# Patient Record
Sex: MALE | Race: WHITE | NOT HISPANIC OR LATINO | Employment: OTHER | URBAN - NONMETROPOLITAN AREA
[De-identification: names, ages, dates, MRNs, and addresses within clinical notes are randomized per-mention and may not be internally consistent; named-entity substitution may affect disease eponyms.]

---

## 2023-11-25 DIAGNOSIS — I42.8 NON-ISCHEMIC CARDIOMYOPATHY (MULTI): ICD-10-CM

## 2023-11-25 DIAGNOSIS — I10 HYPERTENSION, UNSPECIFIED TYPE: ICD-10-CM

## 2023-11-27 PROBLEM — I49.9 IRREGULAR HEART RATE: Status: ACTIVE | Noted: 2023-11-27

## 2023-11-27 PROBLEM — R91.1 PULMONARY NODULE: Status: ACTIVE | Noted: 2023-11-27

## 2023-11-27 PROBLEM — F14.91 HISTORY OF COCAINE USE: Status: ACTIVE | Noted: 2023-11-27

## 2023-11-27 PROBLEM — I50.9: Status: ACTIVE | Noted: 2023-11-27

## 2023-11-27 PROBLEM — E11.9 DIABETES MELLITUS (MULTI): Status: ACTIVE | Noted: 2023-11-27

## 2023-11-27 PROBLEM — I21.4 NON-ST ELEVATION MYOCARDIAL INFARCTION (NSTEMI) (MULTI): Status: ACTIVE | Noted: 2023-11-27

## 2023-11-27 PROBLEM — I25.119 CORONARY ARTERY DISEASE INVOLVING NATIVE CORONARY ARTERY OF NATIVE HEART WITH ANGINA PECTORIS (CMS-HCC): Status: ACTIVE | Noted: 2023-11-27

## 2023-11-27 PROBLEM — E78.5 HLD (HYPERLIPIDEMIA): Status: ACTIVE | Noted: 2023-11-27

## 2023-11-27 PROBLEM — I10 HYPERTENSION: Status: ACTIVE | Noted: 2023-11-27

## 2023-11-27 PROBLEM — R79.89 ELEVATED TROPONIN: Status: ACTIVE | Noted: 2023-11-27

## 2023-11-27 PROBLEM — I42.8 NON-ISCHEMIC CARDIOMYOPATHY (MULTI): Status: ACTIVE | Noted: 2023-11-27

## 2023-11-27 RX ORDER — ATORVASTATIN CALCIUM 40 MG/1
40 TABLET, FILM COATED ORAL DAILY
COMMUNITY

## 2023-11-27 RX ORDER — OMEPRAZOLE 40 MG/1
40 CAPSULE, DELAYED RELEASE ORAL DAILY
COMMUNITY

## 2023-11-27 RX ORDER — MAGNESIUM 250 MG
1 TABLET ORAL DAILY
COMMUNITY

## 2023-11-27 RX ORDER — GINKGO BILOBA LEAF EXTRACT 40 MG
1 CAPSULE ORAL DAILY
COMMUNITY

## 2023-11-27 RX ORDER — ASPIRIN 81 MG/1
1 TABLET ORAL DAILY
COMMUNITY

## 2023-11-27 RX ORDER — METFORMIN HYDROCHLORIDE 500 MG/1
500 TABLET, EXTENDED RELEASE ORAL
COMMUNITY

## 2023-11-27 RX ORDER — LOSARTAN POTASSIUM 25 MG/1
25 TABLET ORAL DAILY
COMMUNITY

## 2023-11-30 RX ORDER — CARVEDILOL 6.25 MG/1
6.25 TABLET ORAL 2 TIMES DAILY
Qty: 180 TABLET | Refills: 0 | Status: SHIPPED | OUTPATIENT
Start: 2023-11-30 | End: 2024-02-29

## 2023-12-12 ENCOUNTER — OFFICE VISIT (OUTPATIENT)
Dept: CARDIOLOGY | Facility: CLINIC | Age: 73
End: 2023-12-12
Payer: MEDICARE

## 2023-12-12 VITALS
HEART RATE: 68 BPM | BODY MASS INDEX: 28.17 KG/M2 | WEIGHT: 208 LBS | SYSTOLIC BLOOD PRESSURE: 112 MMHG | DIASTOLIC BLOOD PRESSURE: 70 MMHG | HEIGHT: 72 IN

## 2023-12-12 DIAGNOSIS — E66.3 OVERWEIGHT WITH BODY MASS INDEX (BMI) OF 28 TO 28.9 IN ADULT: ICD-10-CM

## 2023-12-12 DIAGNOSIS — I42.8 NON-ISCHEMIC CARDIOMYOPATHY (MULTI): ICD-10-CM

## 2023-12-12 DIAGNOSIS — I50.22 CHRONIC SYSTOLIC CONGESTIVE HEART FAILURE, NYHA CLASS 2 (MULTI): ICD-10-CM

## 2023-12-12 DIAGNOSIS — E11.9 TYPE 2 DIABETES MELLITUS WITHOUT COMPLICATION, WITHOUT LONG-TERM CURRENT USE OF INSULIN (MULTI): ICD-10-CM

## 2023-12-12 DIAGNOSIS — E78.2 MIXED HYPERLIPIDEMIA: ICD-10-CM

## 2023-12-12 DIAGNOSIS — I10 PRIMARY HYPERTENSION: ICD-10-CM

## 2023-12-12 DIAGNOSIS — I25.119 CORONARY ARTERY DISEASE INVOLVING NATIVE CORONARY ARTERY OF NATIVE HEART WITH ANGINA PECTORIS (CMS-HCC): ICD-10-CM

## 2023-12-12 PROCEDURE — 1036F TOBACCO NON-USER: CPT | Performed by: INTERNAL MEDICINE

## 2023-12-12 PROCEDURE — 99213 OFFICE O/P EST LOW 20 MIN: CPT | Performed by: INTERNAL MEDICINE

## 2023-12-12 PROCEDURE — 3074F SYST BP LT 130 MM HG: CPT | Performed by: INTERNAL MEDICINE

## 2023-12-12 PROCEDURE — 1160F RVW MEDS BY RX/DR IN RCRD: CPT | Performed by: INTERNAL MEDICINE

## 2023-12-12 PROCEDURE — 4010F ACE/ARB THERAPY RXD/TAKEN: CPT | Performed by: INTERNAL MEDICINE

## 2023-12-12 PROCEDURE — 3078F DIAST BP <80 MM HG: CPT | Performed by: INTERNAL MEDICINE

## 2023-12-12 PROCEDURE — 1159F MED LIST DOCD IN RCRD: CPT | Performed by: INTERNAL MEDICINE

## 2023-12-12 ASSESSMENT — ENCOUNTER SYMPTOMS: DIZZINESS: 1

## 2023-12-12 NOTE — PATIENT INSTRUCTIONS
Please bring all medicines, vitamins, and herbal supplements with you when you come to the office.    Prescriptions will not be filled unless you are compliant with your follow up appointments or have a follow up appointment scheduled as per instruction of your physician. Refills should be requested at the time of your visit.      Drink plenty of fluids/water

## 2023-12-12 NOTE — PROGRESS NOTES
Subjective   Alfie Slaughter is a 73 y.o. male       Chief Complaint    Follow-up          73-year-old gentleman returns for annual follow-up and is doing well with complaints of dizziness.  He denies any shortness of breath, hospitalizations or angina, he denies any syncope.  He has a history of non-STEMI associated with cocaine use several years ago, cardiac catheterization revealed moderate two-vessel disease that underwent physiologic/FFR assessment and was found to be negative.  LV function is mildly impaired with ejection fraction last measured at 41%.  He remains on appropriate GDMT as reviewed and tolerating these medications well again with no repeat hospitalizations    He does smoke marijuana but no longer imbibes in cocaine.    He does have diabetes.  His last laboratory assessment revealed LDL level of 54 normal liver function tests    Recommendations: Continue current therapies, follow-up again in 1 year         Review of Systems   Neurological:  Positive for dizziness.   All other systems reviewed and are negative.         Visit Vitals  /70 (BP Location: Left arm, Patient Position: Sitting)   Pulse 68   Ht 1.829 m (6')   Wt 94.3 kg (208 lb)   BMI 28.21 kg/m²   Smoking Status Former   BSA 2.19 m²        Objective   Physical Exam  Constitutional:       Appearance: Normal appearance. He is normal weight.   HENT:      Nose: Nose normal.   Neck:      Vascular: No carotid bruit.   Cardiovascular:      Rate and Rhythm: Normal rate.      Pulses: Normal pulses.      Heart sounds: Normal heart sounds.   Pulmonary:      Effort: Pulmonary effort is normal.   Abdominal:      General: Bowel sounds are normal.      Palpations: Abdomen is soft.   Genitourinary:     Rectum: Normal.   Musculoskeletal:         General: Normal range of motion.      Cervical back: Normal range of motion.      Right lower leg: No edema.      Left lower leg: No edema.   Skin:     General: Skin is warm and dry.   Neurological:       General: No focal deficit present.      Mental Status: He is alert.   Psychiatric:         Mood and Affect: Mood normal.         Behavior: Behavior normal.         Thought Content: Thought content normal.         Judgment: Judgment normal.         Current Medications    Current Outpatient Medications:     aspirin 81 mg EC tablet, Take 1 tablet (81 mg) by mouth once daily., Disp: , Rfl:     atorvastatin (Lipitor) 40 mg tablet, Take 1 tablet (40 mg) by mouth once daily., Disp: , Rfl:     carvedilol (Coreg) 6.25 mg tablet, Take 1 tablet by mouth twice daily, Disp: 180 tablet, Rfl: 0    ginkgo biloba leaf extract 40 mg capsule, Take 1 capsule by mouth once daily., Disp: , Rfl:     losartan (Cozaar) 25 mg tablet, Take 1 tablet (25 mg) by mouth once daily., Disp: , Rfl:     magnesium 250 mg tablet, Take 1 tablet (250 mg) by mouth once daily., Disp: , Rfl:     metFORMIN  mg 24 hr tablet, Take 1 tablet (500 mg) by mouth 2 times a day with meals., Disp: , Rfl:     multivit-min/ferrous fumarate (MULTI VITAMIN ORAL), Take 1 tablet by mouth once daily., Disp: , Rfl:     omeprazole (PriLOSEC) 40 mg DR capsule, Take 1 capsule (40 mg) by mouth once daily., Disp: , Rfl:                      Assessment/Plan   1. Coronary artery disease involving native coronary artery of native heart with angina pectoris (CMS/HCC)  Follow Up In Cardiology      2. Non-ischemic cardiomyopathy (CMS/HCC)  Follow Up In Cardiology      3. Chronic systolic congestive heart failure, NYHA class 2 (CMS/HCC)        4. Mixed hyperlipidemia        5. Primary hypertension        6. Type 2 diabetes mellitus without complication, without long-term current use of insulin (CMS/HCC)        7. Overweight with body mass index (BMI) of 28 to 28.9 in adult             Scribe Attestation  By signing my name below, Conchita MERAZ LPN  , Scribe   attest that this documentation has been prepared under the direction and in the presence of Erik Thomas DO.

## 2024-02-29 DIAGNOSIS — I10 HYPERTENSION, UNSPECIFIED TYPE: ICD-10-CM

## 2024-02-29 DIAGNOSIS — I42.8 NON-ISCHEMIC CARDIOMYOPATHY (MULTI): ICD-10-CM

## 2024-02-29 RX ORDER — CARVEDILOL 6.25 MG/1
6.25 TABLET ORAL 2 TIMES DAILY
Qty: 180 TABLET | Refills: 3 | Status: SHIPPED | OUTPATIENT
Start: 2024-02-29

## 2024-06-25 ENCOUNTER — APPOINTMENT (OUTPATIENT)
Dept: CARDIOLOGY | Facility: CLINIC | Age: 74
End: 2024-06-25
Payer: MEDICARE

## 2024-06-25 VITALS
HEART RATE: 79 BPM | HEIGHT: 71 IN | SYSTOLIC BLOOD PRESSURE: 108 MMHG | DIASTOLIC BLOOD PRESSURE: 60 MMHG | BODY MASS INDEX: 28.84 KG/M2 | WEIGHT: 206 LBS

## 2024-06-25 DIAGNOSIS — I10 PRIMARY HYPERTENSION: ICD-10-CM

## 2024-06-25 DIAGNOSIS — Z87.891 FORMER SMOKER: ICD-10-CM

## 2024-06-25 DIAGNOSIS — I47.10 PAROXYSMAL SUPRAVENTRICULAR TACHYCARDIA (CMS-HCC): ICD-10-CM

## 2024-06-25 DIAGNOSIS — E11.9 TYPE 2 DIABETES MELLITUS WITHOUT COMPLICATION, WITHOUT LONG-TERM CURRENT USE OF INSULIN (MULTI): ICD-10-CM

## 2024-06-25 DIAGNOSIS — I25.10 ASHD (ARTERIOSCLEROTIC HEART DISEASE): ICD-10-CM

## 2024-06-25 DIAGNOSIS — I42.8 NON-ISCHEMIC CARDIOMYOPATHY (MULTI): ICD-10-CM

## 2024-06-25 PROBLEM — R42 DIZZINESS: Status: ACTIVE | Noted: 2024-06-25

## 2024-06-25 PROCEDURE — 1036F TOBACCO NON-USER: CPT | Performed by: INTERNAL MEDICINE

## 2024-06-25 PROCEDURE — 3078F DIAST BP <80 MM HG: CPT | Performed by: INTERNAL MEDICINE

## 2024-06-25 PROCEDURE — 93000 ELECTROCARDIOGRAM COMPLETE: CPT | Performed by: INTERNAL MEDICINE

## 2024-06-25 PROCEDURE — 99214 OFFICE O/P EST MOD 30 MIN: CPT | Performed by: INTERNAL MEDICINE

## 2024-06-25 PROCEDURE — 1160F RVW MEDS BY RX/DR IN RCRD: CPT | Performed by: INTERNAL MEDICINE

## 2024-06-25 PROCEDURE — 1159F MED LIST DOCD IN RCRD: CPT | Performed by: INTERNAL MEDICINE

## 2024-06-25 PROCEDURE — 3074F SYST BP LT 130 MM HG: CPT | Performed by: INTERNAL MEDICINE

## 2024-06-25 RX ORDER — LOSARTAN POTASSIUM 25 MG/1
25 TABLET ORAL DAILY
Qty: 90 TABLET | Refills: 3 | Status: SHIPPED | OUTPATIENT
Start: 2024-06-25 | End: 2025-06-25

## 2024-06-25 RX ORDER — DIGOXIN 125 MCG
2 TABLET ORAL DAILY
COMMUNITY
End: 2024-06-25 | Stop reason: ALTCHOICE

## 2024-06-25 RX ORDER — CARVEDILOL 6.25 MG/1
6.25 TABLET ORAL 2 TIMES DAILY
Qty: 180 TABLET | Refills: 3 | Status: SHIPPED | OUTPATIENT
Start: 2024-06-25 | End: 2025-06-25

## 2024-06-25 RX ORDER — DILTIAZEM HYDROCHLORIDE 30 MG/1
30 TABLET, FILM COATED ORAL 3 TIMES DAILY
COMMUNITY
End: 2024-06-25 | Stop reason: ALTCHOICE

## 2024-06-25 RX ORDER — DILTIAZEM HYDROCHLORIDE 30 MG/1
30 TABLET, FILM COATED ORAL AS NEEDED
Qty: 120 TABLET | Refills: 11 | Status: SHIPPED | OUTPATIENT
Start: 2024-06-25 | End: 2025-06-25

## 2024-06-25 NOTE — LETTER
June 25, 2024     RHODA Orellana-CNP  [address] [facility] [id number]    Patient: Alfie Slaughter   YOB: 1950   Date of Visit: 6/25/2024       Dear Dr. La Waggoner, RHODA-CNP:    Thank you for referring Alfie Slaughter to me for evaluation. Below are my notes for this consultation.  If you have questions, please do not hesitate to call me. I look forward to following your patient along with you.       Sincerely,     Erik Thomas, DO      CC: No Recipients  ______________________________________________________________________________________    Subjective   Alfie Slaughter is a 74 y.o. male       Chief Complaint    Hospital Follow-up          74-year-old gentleman returns to the office with interim events, with episodes of PSVT, hospitalized at [facility], initiated on diltiazem and digoxin, seen by Dr. Smith, his consult note is reviewed.  Also seen at [facility] emergency room for other non-arrhythmic complaints but discharged after receiving fluids, hydration, magnesium and Rod telemetry patch which he just mailed in last Monday.  We are not in receipt of his telemetry monitor as of yet.  Finally and Janae 3 he was in [facility] ER for tachyarrhythmia as well, it appears they gave him either diltiazem or Adenocard that slowed him down significantly and he was discharged to follow-up with myself.  Details of all these ER visits and consult notes are reviewed    He is currently stable, sinus rhythm today at a rate of 79 a QT corrected interval 474 ms with leftward axis and cannot rule out anterolateral MI by voltage criteria.    He has had previous non-ST elevation MI with negative FFR assessment of both LAD and circumflex     Patient is otherwise doing well currently, ECG as described above, we did discuss potential for EP referral for any recurrence of PSVT.  Will continue to recommend as needed diltiazem 30 mg as needed for recurrence and if multiple recurrences will  "refer to EP in the future      Review of Systems   All other systems reviewed and are negative.           Vitals:    06/25/24 1616   BP: 108/60   BP Location: Right arm   Patient Position: Sitting   Pulse: 79   Weight: 93.4 kg (206 lb)   Height: 1.803 m (5' 11\")        EKG done in office today     Objective   Physical Exam  Constitutional:       Appearance: Normal appearance.   HENT:      Nose: Nose normal.   Neck:      Vascular: No carotid bruit.   Cardiovascular:      Rate and Rhythm: Normal rate.      Pulses: Normal pulses.      Heart sounds: Normal heart sounds.   Pulmonary:      Effort: Pulmonary effort is normal.   Abdominal:      General: Bowel sounds are normal.      Palpations: Abdomen is soft.   Musculoskeletal:         General: Normal range of motion.      Cervical back: Normal range of motion.      Right lower leg: No edema.      Left lower leg: No edema.   Skin:     General: Skin is warm and dry.   Neurological:      General: No focal deficit present.      Mental Status: He is alert.   Psychiatric:         Mood and Affect: Mood normal.         Behavior: Behavior normal.         Thought Content: Thought content normal.         Judgment: Judgment normal.       Allergies  Amoxicillin, Ampicillin, and Penicillins     Current Medications    Current Outpatient Medications:   •  aspirin 81 mg EC tablet, Take 1 tablet (81 mg) by mouth once daily., Disp: , Rfl:   •  atorvastatin (Lipitor) 40 mg tablet, Take 1 tablet (40 mg) by mouth once daily., Disp: , Rfl:   •  carvedilol (Coreg) 6.25 mg tablet, Take 1 tablet by mouth twice daily, Disp: 180 tablet, Rfl: 3  •  ginkgo biloba leaf extract 40 mg capsule, Take 1 capsule by mouth once daily., Disp: , Rfl:   •  losartan (Cozaar) 25 mg tablet, Take 1 tablet (25 mg) by mouth once daily., Disp: , Rfl:   •  magnesium 250 mg tablet, Take 1 tablet (250 mg) by mouth once daily., Disp: , Rfl:   •  metFORMIN  mg 24 hr tablet, Take 1 tablet (500 mg) by mouth 2 times " daily (morning and late afternoon)., Disp: , Rfl:   •  multivit-min/ferrous fumarate (MULTI VITAMIN ORAL), Take 1 tablet by mouth once daily., Disp: , Rfl:   •  omeprazole (PriLOSEC) 40 mg DR capsule, Take 1 capsule (40 mg) by mouth once daily., Disp: , Rfl:                      Assessment/Plan   1. ASHD (arteriosclerotic heart disease)        2. Paroxysmal supraventricular tachycardia (CMS-HCC)        3. Non-ischemic cardiomyopathy (Multi)        4. Type 2 diabetes mellitus without complication, without long-term current use of insulin (Multi)        5. Primary hypertension        6. BMI 28.0-28.9,adult        7. Former smoker                 Scribe Attestation  By signing my name below, I, Theresa DUNLAP LPN  , Scribe   attest that this documentation has been prepared under the direction and in the presence of Erik Thomas DO.     Provider Attestation - Scribe documentation    All medical record entries made by the Scribe were at my direction and personally dictated by me. I have reviewed the chart and agree that the record accurately reflects my personal performance of the history, physical exam, discussion and plan.

## 2024-06-25 NOTE — PATIENT INSTRUCTIONS
BMI was above normal measurement. Current weight: 93.4 kg (206 lb)  Weight change since last visit (-) denotes wt loss -2 lbs   Weight loss needed to achieve BMI 25: 27.1 Lbs  Weight loss needed to achieve BMI 30: -8.6 Lbs  Please bring all medicines, vitamins, and herbal supplements with you when you come to the office.    Prescriptions will not be filled unless you are compliant with your follow up appointments or have a follow up appointment scheduled as per instruction of your physician. Refills should be requested at the time of your visit.

## 2024-06-25 NOTE — PROGRESS NOTES
"Subjective   Alfie Slaughter is a 74 y.o. male       Chief Complaint    Hospital Follow-up          74-year-old gentleman returns to the office with interim events, with episodes of PSVT, hospitalized at [facility], initiated on diltiazem and digoxin, seen by Dr. Smith, his consult note is reviewed.  Also seen at [facility] emergency room for other non-arrhythmic complaints but discharged after receiving fluids, hydration, magnesium and Rod telemetry patch which he just mailed in last Monday.  We are not in receipt of his telemetry monitor as of yet.  Finally and Janae 3 he was in [facility] ER for tachyarrhythmia as well, it appears they gave him either diltiazem or Adenocard that slowed him down significantly and he was discharged to follow-up with myself.  Details of all these ER visits and consult notes are reviewed    He is currently stable, sinus rhythm today at a rate of 79 a QT corrected interval 474 ms with leftward axis and cannot rule out anterolateral MI by voltage criteria.    He has had previous non-ST elevation MI with negative FFR assessment of both LAD and circumflex     Patient is otherwise doing well currently, ECG as described above, we did discuss potential for EP referral for any recurrence of PSVT.  Will continue to recommend as needed diltiazem 30 mg as needed for recurrence and if multiple recurrences will refer to EP in the future      Review of Systems   All other systems reviewed and are negative.           Vitals:    06/25/24 1616   BP: 108/60   BP Location: Right arm   Patient Position: Sitting   Pulse: 79   Weight: 93.4 kg (206 lb)   Height: 1.803 m (5' 11\")        EKG done in office today     Objective   Physical Exam  Constitutional:       Appearance: Normal appearance.   HENT:      Nose: Nose normal.   Neck:      Vascular: No carotid bruit.   Cardiovascular:      Rate and Rhythm: Normal rate.      Pulses: Normal pulses.      Heart sounds: Normal heart sounds. "   Pulmonary:      Effort: Pulmonary effort is normal.   Abdominal:      General: Bowel sounds are normal.      Palpations: Abdomen is soft.   Musculoskeletal:         General: Normal range of motion.      Cervical back: Normal range of motion.      Right lower leg: No edema.      Left lower leg: No edema.   Skin:     General: Skin is warm and dry.   Neurological:      General: No focal deficit present.      Mental Status: He is alert.   Psychiatric:         Mood and Affect: Mood normal.         Behavior: Behavior normal.         Thought Content: Thought content normal.         Judgment: Judgment normal.       Allergies  Amoxicillin, Ampicillin, and Penicillins     Current Medications    Current Outpatient Medications:     aspirin 81 mg EC tablet, Take 1 tablet (81 mg) by mouth once daily., Disp: , Rfl:     atorvastatin (Lipitor) 40 mg tablet, Take 1 tablet (40 mg) by mouth once daily., Disp: , Rfl:     carvedilol (Coreg) 6.25 mg tablet, Take 1 tablet by mouth twice daily, Disp: 180 tablet, Rfl: 3    ginkgo biloba leaf extract 40 mg capsule, Take 1 capsule by mouth once daily., Disp: , Rfl:     losartan (Cozaar) 25 mg tablet, Take 1 tablet (25 mg) by mouth once daily., Disp: , Rfl:     magnesium 250 mg tablet, Take 1 tablet (250 mg) by mouth once daily., Disp: , Rfl:     metFORMIN  mg 24 hr tablet, Take 1 tablet (500 mg) by mouth 2 times daily (morning and late afternoon)., Disp: , Rfl:     multivit-min/ferrous fumarate (MULTI VITAMIN ORAL), Take 1 tablet by mouth once daily., Disp: , Rfl:     omeprazole (PriLOSEC) 40 mg DR capsule, Take 1 capsule (40 mg) by mouth once daily., Disp: , Rfl:                      Assessment/Plan   1. ASHD (arteriosclerotic heart disease)        2. Paroxysmal supraventricular tachycardia (CMS-HCC)        3. Non-ischemic cardiomyopathy (Multi)        4. Type 2 diabetes mellitus without complication, without long-term current use of insulin (Multi)        5. Primary hypertension         6. BMI 28.0-28.9,adult        7. Former smoker                 Scribe Attestation  By signing my name below, I, Theresa DUNLAP LPN  , Scribe   attest that this documentation has been prepared under the direction and in the presence of Erik Thomas DO.     Provider Attestation - Scribe documentation    All medical record entries made by the Scribe were at my direction and personally dictated by me. I have reviewed the chart and agree that the record accurately reflects my personal performance of the history, physical exam, discussion and plan.

## 2024-12-03 ENCOUNTER — APPOINTMENT (OUTPATIENT)
Dept: CARDIOLOGY | Facility: CLINIC | Age: 74
End: 2024-12-03
Payer: MEDICARE

## 2025-01-08 ENCOUNTER — TELEPHONE (OUTPATIENT)
Dept: CARDIOLOGY | Facility: CLINIC | Age: 75
End: 2025-01-08

## 2025-01-08 ENCOUNTER — APPOINTMENT (OUTPATIENT)
Dept: CARDIOLOGY | Facility: CLINIC | Age: 75
End: 2025-01-08
Payer: MEDICARE

## 2025-01-08 VITALS
DIASTOLIC BLOOD PRESSURE: 90 MMHG | SYSTOLIC BLOOD PRESSURE: 140 MMHG | BODY MASS INDEX: 27.58 KG/M2 | HEART RATE: 64 BPM | HEIGHT: 71 IN | WEIGHT: 197 LBS

## 2025-01-08 DIAGNOSIS — E78.2 MIXED HYPERLIPIDEMIA: ICD-10-CM

## 2025-01-08 DIAGNOSIS — I42.8 NON-ISCHEMIC CARDIOMYOPATHY (MULTI): ICD-10-CM

## 2025-01-08 DIAGNOSIS — I10 PRIMARY HYPERTENSION: ICD-10-CM

## 2025-01-08 DIAGNOSIS — R42 DIZZINESS: ICD-10-CM

## 2025-01-08 DIAGNOSIS — I47.10 PAROXYSMAL SUPRAVENTRICULAR TACHYCARDIA (CMS-HCC): ICD-10-CM

## 2025-01-08 DIAGNOSIS — I25.119 CORONARY ARTERY DISEASE INVOLVING NATIVE CORONARY ARTERY OF NATIVE HEART WITH ANGINA PECTORIS: ICD-10-CM

## 2025-01-08 DIAGNOSIS — I50.22 CHRONIC SYSTOLIC CONGESTIVE HEART FAILURE, NYHA CLASS 2: ICD-10-CM

## 2025-01-08 DIAGNOSIS — Z87.891 FORMER SMOKER: ICD-10-CM

## 2025-01-08 DIAGNOSIS — I21.4 NON-ST ELEVATION MYOCARDIAL INFARCTION (NSTEMI) (MULTI): ICD-10-CM

## 2025-01-08 PROBLEM — I25.10 ASHD (ARTERIOSCLEROTIC HEART DISEASE): Status: RESOLVED | Noted: 2024-06-25 | Resolved: 2025-01-08

## 2025-01-08 PROBLEM — I49.9 IRREGULAR HEART RATE: Status: RESOLVED | Noted: 2023-11-27 | Resolved: 2025-01-08

## 2025-01-08 PROBLEM — R79.89 ELEVATED TROPONIN: Status: RESOLVED | Noted: 2023-11-27 | Resolved: 2025-01-08

## 2025-01-08 PROCEDURE — 3080F DIAST BP >= 90 MM HG: CPT | Performed by: INTERNAL MEDICINE

## 2025-01-08 PROCEDURE — 1036F TOBACCO NON-USER: CPT | Performed by: INTERNAL MEDICINE

## 2025-01-08 PROCEDURE — G2211 COMPLEX E/M VISIT ADD ON: HCPCS | Performed by: INTERNAL MEDICINE

## 2025-01-08 PROCEDURE — 99215 OFFICE O/P EST HI 40 MIN: CPT | Performed by: INTERNAL MEDICINE

## 2025-01-08 PROCEDURE — 1160F RVW MEDS BY RX/DR IN RCRD: CPT | Performed by: INTERNAL MEDICINE

## 2025-01-08 PROCEDURE — 1159F MED LIST DOCD IN RCRD: CPT | Performed by: INTERNAL MEDICINE

## 2025-01-08 PROCEDURE — 3077F SYST BP >= 140 MM HG: CPT | Performed by: INTERNAL MEDICINE

## 2025-01-08 RX ORDER — METOPROLOL SUCCINATE 50 MG/1
50 TABLET, EXTENDED RELEASE ORAL DAILY
Qty: 90 TABLET | Refills: 3 | Status: SHIPPED | OUTPATIENT
Start: 2025-01-08 | End: 2026-01-08

## 2025-01-08 ASSESSMENT — ENCOUNTER SYMPTOMS: DIZZINESS: 1

## 2025-01-08 NOTE — PROGRESS NOTES
Subjective   Alfie Slaughter is a 74 y.o. male       Chief Complaint    Follow-up          Patient returns for follow-up, has had episodes of tachyarrhythmia which he is able to monitor with his blood pressure cuff and with heart rates up to 1 50-1 70 beats a minute that spontaneously returns back down to normal level.  These episodes are transient.  His only symptoms are dizziness.  He has had no syncope or chest discomfort.    As mentioned in last office visit, he had previous hospitalizations for PSVT, and even electively went to [facility] ER where they placed a Zio patch for which we never had results nor did [facility] provide any follow-up phone call.  Today we downloaded his ZIO monitor results revealing several episodes of PSVT, bigeminy, and nonsustained ventricular tachycardia.  Longest episodes of nonsustained VT was approximately 11 seconds and fastest rate was 230 beats a minute.  His baseline rhythm is sinus rhythm.    He has a history of non-ST elevation MI 2019 with negative FFR assessment of both LAD and circumflex, and coronary disease is subsequently been treated conservatively; at that time he had moderately severe LV dysfunction and by 2020 with appropriate GDMT is LV function had normalized with ejection fraction of 60% by echo.  Subsequently 2022 ischemic assessment revealed no evidence for ischemia or infarction and ejection fraction 42% by stress imaging.  He currently has no complaints of angina, nitrate usage, ACS events or heart failure.  He has not had a recent ischemic assessment.    Further extensive review of his [facility] ZIO monitor, previous heart catheterization and previous imaging procedures and outpatient ER visits reviewed today.    Notably, he used to imbibe in crack cocaine but this habit has been discontinued for several years; he still smokes marijuana daily.    Recommendations, proceed with myocardial perfusion stress imaging with Lexiscan, referred  "to electrophysiology for consideration of further advanced therapies; if stress perfusion exam comes back abnormal we did discuss consideration for repeat heart catheterization.                  Review of Systems   Neurological:  Positive for dizziness.   All other systems reviewed and are negative.           Vitals:    01/08/25 0944   BP: 140/90   BP Location: Left arm   Patient Position: Sitting   Pulse: 64   Weight: 89.4 kg (197 lb)   Height: 1.803 m (5' 11\")        Objective   Physical Exam  Constitutional:       Appearance: Normal appearance.   HENT:      Nose: Nose normal.   Neck:      Vascular: No carotid bruit.   Cardiovascular:      Rate and Rhythm: Normal rate.      Pulses: Normal pulses.      Heart sounds: Normal heart sounds.   Pulmonary:      Effort: Pulmonary effort is normal.   Abdominal:      General: Bowel sounds are normal.      Palpations: Abdomen is soft.   Musculoskeletal:         General: Normal range of motion.      Cervical back: Normal range of motion.      Right lower leg: No edema.      Left lower leg: No edema.   Skin:     General: Skin is warm and dry.   Neurological:      General: No focal deficit present.      Mental Status: He is alert.   Psychiatric:         Mood and Affect: Mood normal.         Behavior: Behavior normal.         Thought Content: Thought content normal.         Judgment: Judgment normal.         Allergies  Amoxicillin, Ampicillin, and Penicillins     Current Medications    Current Outpatient Medications:     aspirin 81 mg EC tablet, Take 1 tablet (81 mg) by mouth once daily., Disp: , Rfl:     atorvastatin (Lipitor) 40 mg tablet, Take 1 tablet (40 mg) by mouth once daily., Disp: , Rfl:     dilTIAZem (Cardizem) 30 mg immediate release tablet, Take 1 tablet (30 mg) by mouth if needed (for PSVT). Take one tablet if PSVT unresolved can take another 1/2 hr later, if remains unresolved go to ER., Disp: 120 tablet, Rfl: 11    losartan (Cozaar) 25 mg tablet, Take 1 tablet (25 " mg) by mouth once daily., Disp: 90 tablet, Rfl: 3    magnesium 250 mg tablet, Take 1 tablet (250 mg) by mouth once daily., Disp: , Rfl:     metFORMIN  mg 24 hr tablet, Take 1 tablet (500 mg) by mouth 2 times daily (morning and late afternoon). (Patient taking differently: Take 1 tablet (500 mg) by mouth once daily in the evening. Take with meals.), Disp: , Rfl:     omeprazole (PriLOSEC) 40 mg DR capsule, Take 1 capsule (40 mg) by mouth once daily., Disp: , Rfl:                      Assessment/Plan   1. Dizziness        2. Coronary artery disease involving native coronary artery of native heart with angina pectoris  Follow Up In Cardiology      3. Non-ischemic cardiomyopathy (Multi)  Follow Up In Cardiology      4. Chronic systolic congestive heart failure, NYHA class 2        5. Non-ST elevation myocardial infarction (NSTEMI) (Multi)        6. Paroxysmal supraventricular tachycardia (CMS-HCC)        7. Primary hypertension        8. Mixed hyperlipidemia        9. BMI 27.0-27.9,adult        10. Former smoker                 Scribe Attestation  By signing my name below, I, Gracia GARIBAY LPN  , Scribe   attest that this documentation has been prepared under the direction and in the presence of Erik Thomas DO.     Provider Attestation - Scribe documentation    All medical record entries made by the Scribe were at my direction and personally dictated by me. I have reviewed the chart and agree that the record accurately reflects my personal performance of the history, physical exam, discussion and plan.

## 2025-01-08 NOTE — TELEPHONE ENCOUNTER
Left patient a voicemail requesting they call back to schedule an appointment with  per referral we received from 's office.

## 2025-01-08 NOTE — PATIENT INSTRUCTIONS
Please bring all medicines, vitamins, and herbal supplements with you when you come to the office.    Prescriptions will not be filled unless you are compliant with your follow up appointments or have a follow up appointment scheduled as per instruction of your physician. Refills should be requested at the time of your visit.     BMI was above normal measurement. Current weight: 89.4 kg (197 lb)  Weight change since last visit (-) denotes wt loss -9 lbs   Weight loss needed to achieve BMI 25: 18.1 Lbs  Weight loss needed to achieve BMI 30: -17.6 Lbs  Provided instructions on dietary changes.

## 2025-01-08 NOTE — LETTER
January 8, 2025     RHODA Orellana-CNP  [address] [facility] [address]    Patient: Alfie Slaughter   YOB: 1950   Date of Visit: 1/8/2025       Dear Dr. La Waggoner, RHODA-CNP:    Thank you for referring Alfie Slaughter to me for evaluation. Below are my notes for this consultation.  If you have questions, please do not hesitate to call me. I look forward to following your patient along with you.       Sincerely,     Erik Thomas, DO      CC: No Recipients  ______________________________________________________________________________________    Subjective   Alfie Slaughter is a 74 y.o. male       Chief Complaint    Follow-up          Patient returns for follow-up, has had episodes of tachyarrhythmia which he is able to monitor with his blood pressure cuff and with heart rates up to 1 50-1 70 beats a minute that spontaneously returns back down to normal level.  These episodes are transient.  His only symptoms are dizziness.  He has had no syncope or chest discomfort.    As mentioned in last office visit, he had previous hospitalizations for PSVT, and even electively went to [facility] ER where they placed a Zio patch for which we never had results nor did [facility] provide any follow-up phone call.  Today we downloaded his ZIO monitor results revealing several episodes of PSVT, bigeminy, and nonsustained ventricular tachycardia.  Longest episodes of nonsustained VT was approximately 11 seconds and fastest rate was 230 beats a minute.  His baseline rhythm is sinus rhythm.    He has a history of non-ST elevation MI 2019 with negative FFR assessment of both LAD and circumflex, and coronary disease is subsequently been treated conservatively; at that time he had moderately severe LV dysfunction and by 2020 with appropriate GDMT is LV function had normalized with ejection fraction of 60% by echo.  Subsequently 2022 ischemic assessment revealed no evidence for ischemia or  "infarction and ejection fraction 42% by stress imaging.  He currently has no complaints of angina, nitrate usage, ACS events or heart failure.  He has not had a recent ischemic assessment.    Further extensive review of his [facility] ZIO monitor, previous heart catheterization and previous imaging procedures and outpatient ER visits reviewed today.    Notably, he used to imbibe in crack cocaine but this habit has been discontinued for several years; he still smokes marijuana daily.    Recommendations, proceed with myocardial perfusion stress imaging with Lexiscan, referred to electrophysiology for consideration of further advanced therapies; if stress perfusion exam comes back abnormal we did discuss consideration for repeat heart catheterization.                  Review of Systems   Neurological:  Positive for dizziness.   All other systems reviewed and are negative.           Vitals:    01/08/25 0944   BP: 140/90   BP Location: Left arm   Patient Position: Sitting   Pulse: 64   Weight: 89.4 kg (197 lb)   Height: 1.803 m (5' 11\")        Objective   Physical Exam  Constitutional:       Appearance: Normal appearance.   HENT:      Nose: Nose normal.   Neck:      Vascular: No carotid bruit.   Cardiovascular:      Rate and Rhythm: Normal rate.      Pulses: Normal pulses.      Heart sounds: Normal heart sounds.   Pulmonary:      Effort: Pulmonary effort is normal.   Abdominal:      General: Bowel sounds are normal.      Palpations: Abdomen is soft.   Musculoskeletal:         General: Normal range of motion.      Cervical back: Normal range of motion.      Right lower leg: No edema.      Left lower leg: No edema.   Skin:     General: Skin is warm and dry.   Neurological:      General: No focal deficit present.      Mental Status: He is alert.   Psychiatric:         Mood and Affect: Mood normal.         Behavior: Behavior normal.         Thought Content: Thought content normal.         Judgment: Judgment normal. "         Allergies  Amoxicillin, Ampicillin, and Penicillins     Current Medications    Current Outpatient Medications:   •  aspirin 81 mg EC tablet, Take 1 tablet (81 mg) by mouth once daily., Disp: , Rfl:   •  atorvastatin (Lipitor) 40 mg tablet, Take 1 tablet (40 mg) by mouth once daily., Disp: , Rfl:   •  dilTIAZem (Cardizem) 30 mg immediate release tablet, Take 1 tablet (30 mg) by mouth if needed (for PSVT). Take one tablet if PSVT unresolved can take another 1/2 hr later, if remains unresolved go to ER., Disp: 120 tablet, Rfl: 11  •  losartan (Cozaar) 25 mg tablet, Take 1 tablet (25 mg) by mouth once daily., Disp: 90 tablet, Rfl: 3  •  magnesium 250 mg tablet, Take 1 tablet (250 mg) by mouth once daily., Disp: , Rfl:   •  metFORMIN  mg 24 hr tablet, Take 1 tablet (500 mg) by mouth 2 times daily (morning and late afternoon). (Patient taking differently: Take 1 tablet (500 mg) by mouth once daily in the evening. Take with meals.), Disp: , Rfl:   •  omeprazole (PriLOSEC) 40 mg DR capsule, Take 1 capsule (40 mg) by mouth once daily., Disp: , Rfl:                      Assessment/Plan   1. Dizziness        2. Coronary artery disease involving native coronary artery of native heart with angina pectoris  Follow Up In Cardiology      3. Non-ischemic cardiomyopathy (Multi)  Follow Up In Cardiology      4. Chronic systolic congestive heart failure, NYHA class 2        5. Non-ST elevation myocardial infarction (NSTEMI) (Multi)        6. Paroxysmal supraventricular tachycardia (CMS-HCC)        7. Primary hypertension        8. Mixed hyperlipidemia        9. BMI 27.0-27.9,adult        10. Former smoker                 Scribe Attestation  By signing my name below, IGracia LPN  , Scribe   attest that this documentation has been prepared under the direction and in the presence of Erik Thomas DO.     Provider Attestation - Scribe documentation    All medical record entries made by the Scribe were at my  direction and personally dictated by me. I have reviewed the chart and agree that the record accurately reflects my personal performance of the history, physical exam, discussion and plan.

## 2025-01-13 NOTE — TELEPHONE ENCOUNTER
Called patient and left a voicemail informing him we had gotten referral for him to see  and that we were calling to set up the appointment. I included a call back number for our office so he could call back and set up the appointment.

## 2025-01-27 ENCOUNTER — HOSPITAL ENCOUNTER (OUTPATIENT)
Dept: RADIOLOGY | Facility: CLINIC | Age: 75
Discharge: HOME | End: 2025-01-27
Payer: MEDICARE

## 2025-01-27 ENCOUNTER — HOSPITAL ENCOUNTER (OUTPATIENT)
Dept: CARDIOLOGY | Facility: CLINIC | Age: 75
Discharge: HOME | End: 2025-01-27
Payer: MEDICARE

## 2025-01-27 VITALS — DIASTOLIC BLOOD PRESSURE: 82 MMHG | HEART RATE: 79 BPM | SYSTOLIC BLOOD PRESSURE: 132 MMHG

## 2025-01-27 DIAGNOSIS — I47.10 PAROXYSMAL SUPRAVENTRICULAR TACHYCARDIA (CMS-HCC): ICD-10-CM

## 2025-01-27 DIAGNOSIS — I25.119 CORONARY ARTERY DISEASE INVOLVING NATIVE CORONARY ARTERY OF NATIVE HEART WITH ANGINA PECTORIS: ICD-10-CM

## 2025-01-27 DIAGNOSIS — I10 PRIMARY HYPERTENSION: ICD-10-CM

## 2025-01-27 DIAGNOSIS — I50.22 CHRONIC SYSTOLIC CONGESTIVE HEART FAILURE, NYHA CLASS 2: ICD-10-CM

## 2025-01-27 DIAGNOSIS — I21.4 NON-ST ELEVATION MYOCARDIAL INFARCTION (NSTEMI) (MULTI): ICD-10-CM

## 2025-01-27 DIAGNOSIS — E78.2 MIXED HYPERLIPIDEMIA: ICD-10-CM

## 2025-01-27 DIAGNOSIS — R42 DIZZINESS: ICD-10-CM

## 2025-01-27 PROCEDURE — 78452 HT MUSCLE IMAGE SPECT MULT: CPT

## 2025-01-27 PROCEDURE — 3430000001 HC RX 343 DIAGNOSTIC RADIOPHARMACEUTICALS: Performed by: INTERNAL MEDICINE

## 2025-01-27 PROCEDURE — 78452 HT MUSCLE IMAGE SPECT MULT: CPT | Performed by: INTERNAL MEDICINE

## 2025-01-27 PROCEDURE — 93016 CV STRESS TEST SUPVJ ONLY: CPT | Performed by: INTERNAL MEDICINE

## 2025-01-27 PROCEDURE — A9502 TC99M TETROFOSMIN: HCPCS | Performed by: INTERNAL MEDICINE

## 2025-01-27 PROCEDURE — 93017 CV STRESS TEST TRACING ONLY: CPT

## 2025-01-27 PROCEDURE — 2500000004 HC RX 250 GENERAL PHARMACY W/ HCPCS (ALT 636 FOR OP/ED): Performed by: INTERNAL MEDICINE

## 2025-01-27 PROCEDURE — 93018 CV STRESS TEST I&R ONLY: CPT | Performed by: INTERNAL MEDICINE

## 2025-01-27 RX ORDER — REGADENOSON 0.08 MG/ML
0.4 INJECTION, SOLUTION INTRAVENOUS ONCE
Status: COMPLETED | OUTPATIENT
Start: 2025-01-27 | End: 2025-01-27

## 2025-01-27 RX ADMIN — TETROFOSMIN 33.5 MILLICURIE: 0.23 INJECTION, POWDER, LYOPHILIZED, FOR SOLUTION INTRAVENOUS at 13:39

## 2025-01-27 RX ADMIN — TETROFOSMIN 11.7 MILLICURIE: 0.23 INJECTION, POWDER, LYOPHILIZED, FOR SOLUTION INTRAVENOUS at 12:29

## 2025-01-27 RX ADMIN — REGADENOSON 0.4 MG: 0.08 INJECTION, SOLUTION INTRAVENOUS at 13:38

## 2025-01-30 ENCOUNTER — TELEPHONE (OUTPATIENT)
Dept: CARDIOLOGY | Facility: CLINIC | Age: 75
End: 2025-01-30
Payer: COMMERCIAL

## 2025-01-30 NOTE — TELEPHONE ENCOUNTER
----- Message from Erik Thomas sent at 1/28/2025  5:20 PM EST -----  No new orders. Please call patient with normal result.  Mild left ventricular dysfunction but unchanged from the past, no evidence for ischemia or infarction, ejection fraction is minimally low at 45 to 48%

## 2025-01-30 NOTE — TELEPHONE ENCOUNTER
Result Communication    Resulted Orders   Nuclear Stress Test    Narrative    Interpreted By:  Yesi Medel and Giannuzzi Michael   STUDY:  MYOCARDIAL PERFUSION STRESS TEST WITH LEXISCAN      Performing facility:  [facility],  [address] [place] [address], [address],  [place] [address]  [facility] Provider:  ANJANA Thompson DO, FACC  PCP:  Dr. KATIE Waggoner CNP  Supervising provider:  Selin Boyd MD, FACC      INDICATION:  CAD;  CHF  NSTEMI  SVT      HISTORY:  Gender:  M; Age:  75 y/o ; Height:  .3 cm cm; Weight:   WT  89.359 kg kg.      High Cholesterol;  CAD;  Diabetes;  Previous MI;2019  HTN;  Arrhythmias;  NICM Quit smoking 11 years ago.      Cardiac catheterization on 2019.      COMPARISON:  Previous nuclear testing completed on2022 at [facility].          ACCESSION NUMBER(S):  [id number]      ORDERING CLINICIAN:  MARITZA THOMPSON      TECHNIQUE:  ONE DAY protocol.  Stress injection: Date:1-27-25, 33.5 mCi of Myoview IV 20 seconds  after rapid injection of Lexiscan. Rest injection: Date: 1-27-25,  11.7 mCi of Myoview IV at rest. The patient had a rapid injection of  0.4 mg of Lexiscan IV over 10 seconds. Imaging was performed by  gated tomographic technique. Reason for Lexiscan:  dizziness/unsteady/fall risk      STRESS TEST DATA:  Resting heart rate was 79 BPM.  Resting blood pressure was 132/82 mmHg.  Peak blood pressure was 122/86 mmHg.  Peak heart rate was 81 BPM.      TEST TERMINATED DUE TO:  Protocol completed      FINDINGS:  STRESS TEST RESULTS:      Resting electrocardiogram revealed normal sinus rhythm with PVCs.  There were no significant ischemic ECG changes or dysrhythmias.  The patient did not have chest pains/symptoms during procedure.  There was a normal recovery phase.      IMAGING RESULTS:      Image quality was good.  Rest and stress tomographic images were reviewed and revealed normal  perfusion without evidence of ischemia, myocardial infarction, or  left ventricular  dilatation with stress. Overall left ventricular  systolic function appeared to be abnormal with global hypokinesis  Ejection fraction was 45%. Left ventricle appears mildly dilated TID  is 0.89 and is normal. There were evidence of inferior attenuation  artifact.        Impression    Abnormal Lexiscan Myoview cardiac perfusion stress test.  No  myocardial ischemia by perfusion imaging.  No  myocardial infarction by perfusion imaging.  Abnormal left ventricular systolic function.  Left ventricular ejection fraction 45 %. With mildly dilated left  ventricle No change when compared to previous study.          Signed by: Yesi Medel 1/28/2025 12:18 PM  Dictation workstation:   [id number]       8:59 AM      Results were successfully communicated with the patient and they acknowledged their understanding.

## 2025-02-18 DIAGNOSIS — I42.8 NON-ISCHEMIC CARDIOMYOPATHY (MULTI): ICD-10-CM

## 2025-02-18 DIAGNOSIS — I10 HYPERTENSION, UNSPECIFIED TYPE: ICD-10-CM

## 2025-02-20 RX ORDER — CARVEDILOL 6.25 MG/1
6.25 TABLET ORAL 2 TIMES DAILY
Qty: 180 TABLET | Refills: 3 | Status: SHIPPED | OUTPATIENT
Start: 2025-02-20

## 2025-02-26 ENCOUNTER — APPOINTMENT (OUTPATIENT)
Dept: CARDIOLOGY | Facility: CLINIC | Age: 75
End: 2025-02-26
Payer: COMMERCIAL

## 2025-02-26 VITALS
SYSTOLIC BLOOD PRESSURE: 112 MMHG | DIASTOLIC BLOOD PRESSURE: 70 MMHG | BODY MASS INDEX: 28.92 KG/M2 | WEIGHT: 202 LBS | HEIGHT: 70 IN | HEART RATE: 70 BPM

## 2025-02-26 DIAGNOSIS — R55 NEAR SYNCOPE: ICD-10-CM

## 2025-02-26 DIAGNOSIS — I50.22 CHRONIC SYSTOLIC CONGESTIVE HEART FAILURE, NYHA CLASS 2: ICD-10-CM

## 2025-02-26 DIAGNOSIS — I42.8 NON-ISCHEMIC CARDIOMYOPATHY (MULTI): Primary | ICD-10-CM

## 2025-02-26 DIAGNOSIS — I25.119 CORONARY ARTERY DISEASE INVOLVING NATIVE CORONARY ARTERY OF NATIVE HEART WITH ANGINA PECTORIS: ICD-10-CM

## 2025-02-26 DIAGNOSIS — Z87.891 FORMER SMOKER: ICD-10-CM

## 2025-02-26 DIAGNOSIS — I47.10 PAROXYSMAL SUPRAVENTRICULAR TACHYCARDIA (CMS-HCC): ICD-10-CM

## 2025-02-26 DIAGNOSIS — R42 DIZZINESS: ICD-10-CM

## 2025-02-26 PROCEDURE — 3074F SYST BP LT 130 MM HG: CPT | Performed by: INTERNAL MEDICINE

## 2025-02-26 PROCEDURE — 3078F DIAST BP <80 MM HG: CPT | Performed by: INTERNAL MEDICINE

## 2025-02-26 PROCEDURE — 1159F MED LIST DOCD IN RCRD: CPT | Performed by: INTERNAL MEDICINE

## 2025-02-26 PROCEDURE — 93000 ELECTROCARDIOGRAM COMPLETE: CPT | Performed by: INTERNAL MEDICINE

## 2025-02-26 PROCEDURE — 1036F TOBACCO NON-USER: CPT | Performed by: INTERNAL MEDICINE

## 2025-02-26 PROCEDURE — 99215 OFFICE O/P EST HI 40 MIN: CPT | Performed by: INTERNAL MEDICINE

## 2025-02-26 RX ORDER — BLOOD SUGAR DIAGNOSTIC
STRIP MISCELLANEOUS
COMMUNITY

## 2025-02-26 RX ORDER — CALCIUM CARBONATE/VITAMIN D3 500-10/5ML
400 LIQUID (ML) ORAL DAILY
COMMUNITY

## 2025-02-26 ASSESSMENT — ENCOUNTER SYMPTOMS
NEAR-SYNCOPE: 0
PALPITATIONS: 1
CLAUDICATION: 0
DYSPNEA ON EXERTION: 0
DIZZINESS: 1
LIGHT-HEADEDNESS: 1
SYNCOPE: 0
IRREGULAR HEARTBEAT: 1
PND: 0
SHORTNESS OF BREATH: 0
ORTHOPNEA: 0
SNORING: 0
WHEEZING: 0
COUGH: 0

## 2025-02-26 NOTE — PATIENT INSTRUCTIONS
Dr. Rene is ordering a 48 hour holter monitor, either today or tomorrow, here or in [place]  Echocardiogram to be done prior to next visit, in [place]  Follow up in 2 weeks with Dr. Rene in [place], since it is closer  Continue same medications and treatments.   Patient educated on proper medication use.   Patient educated on risk factor modification.   Please bring any lab results from other providers / physicians to your next appointment.     Please bring all medicines, vitamins, and herbal supplements with you when you come to the office.     Prescriptions will not be filled unless you are compliant with your follow up appointments or have a follow up appointment scheduled as per instruction of your physician. Refills should be requested at the time of your visit.  I, OG HARMON LPN, AM SCRIBING FOR, AND IN THE PRESENCE OF DR. MAYRA RENE MD

## 2025-02-26 NOTE — PROGRESS NOTES
CARDIOLOGY OFFICE VISIT      CHIEF COMPLAINT  Chief Complaint   Patient presents with    New Patient Visit     Per Dr Thomas for irregular heart beat       HISTORY OF PRESENT ILLNESS  HPI  75-year-old male with a past medical history of polysubstance abuse including cocaine.  He apparently has been off cocaine use for the last 5 years.  Looking at his records because patient is very poor historian, patient has been complaining of palpitations on and off.  He had prior hospitalizations for SVT.  Apparently during Emergency Department evaluation due to palpitations, he had a Zio patch that was done in May 2024.  Results were not clear and apparently did not provide any follow-ups.  Zio patch show episodes of ventricular tachycardia nonsustained with the longest duration 11 seconds and the fastest 230 bpm.  He has a history of non-ST elevation microinfarction 2019 with negative FFR assessment above LAD and circumflex and coronary disease subsequently been 3 conservatively.  He had moderate severe left ventricular dysfunction in 2020 but a normalize later with a value of 60%. Subsequently 2022 ischemic assessment revealed no evidence for ischemia or infarction and ejection fraction 42% by stress imaging.    Based on results of the Holter monitor, and a stress test was performed.    Stress test 01/2025  IMPRESSION:  Abnormal Lexiscan Myoview cardiac perfusion stress test.  No  myocardial ischemia by perfusion imaging.  No  myocardial infarction by perfusion imaging.  Abnormal left ventricular systolic function.  Left ventricular ejection fraction 45 %. With mildly dilated left  ventricle No change when compared to previous study.      He states that he continues having episodes of dizziness.  Sometimes episodes of dizziness have been for 5 to 10 minutes of duration.  Dizziness appearing almost every day.    EKG performed today shows sinus rhythm with rate of 70 bpm r QRS duration 96 ms QT corrected 430 ms.  Rhythm strip  shows the same pattern.    Patient was placed on Cardizem and beta-blocker therapy.        Past Medical History  Past Medical History:   Diagnosis Date    Arrhythmia     Coronary artery disease     Diabetes mellitus (Multi)     Hypertension        Social History  Social History     Tobacco Use    Smoking status: Former     Current packs/day: 0.00     Average packs/day: 1 pack/day for 15.0 years (15.0 ttl pk-yrs)     Types: Cigarettes     Quit date:      Years since quittin.1    Smokeless tobacco: Never   Substance Use Topics    Alcohol use: Yes     Alcohol/week: 1.0 standard drink of alcohol     Types: 1 Shots of liquor per week     Comment: occasional    Drug use: Yes     Types: Marijuana     Comment: last used cocaine 2020, currently uses marijuana       Family History     Family History   Problem Relation Name Age of Onset    Hypertension Mother      Heart attack Mother      Colon cancer Brother      Kidney cancer Brother      Diabetes Brother          Allergies:  Allergies   Allergen Reactions    Amoxicillin Itching    Ampicillin Itching    Penicillins Itching        Outpatient Medications:  Current Outpatient Medications   Medication Instructions    aspirin 81 mg EC tablet 1 tablet, Daily    atorvastatin (LIPITOR) 40 mg, Daily    carvedilol (COREG) 6.25 mg, oral, 2 times daily    dilTIAZem (CARDIZEM) 30 mg, oral, As needed, Take one tablet if PSVT unresolved can take another 1/2 hr later, if remains unresolved go to ER.    losartan (COZAAR) 25 mg, oral, Daily    magnesium oxide 400 mg, Daily    metFORMIN XR (GLUCOPHAGE-XR) 500 mg, 2 times daily (morning and late afternoon)    metoprolol succinate XL (TOPROL-XL) 50 mg, oral, Daily, Do not crush or chew.    omeprazole (PRILOSEC) 40 mg, Daily    OneTouch Ultra Test strip use 1 strip to check glucose once daily          REVIEW OF SYSTEMS  Review of Systems   Constitutional: Negative for malaise/fatigue.   Cardiovascular:  Positive for irregular heartbeat  "and palpitations. Negative for claudication, cyanosis, dyspnea on exertion, leg swelling, near-syncope, orthopnea, paroxysmal nocturnal dyspnea and syncope.   Respiratory:  Negative for cough, shortness of breath, snoring and wheezing.    Neurological:  Positive for dizziness and light-headedness.        Pt. Reports \"Hot flashes\" once or twice a day for 5- 10 minutes at a time, since last June.  Has issues daily with dizziness.   All other systems reviewed and are negative.        VITALS  Vitals:    02/26/25 1128   BP: 112/70   Pulse: 70       PHYSICAL EXAM  Constitutional:       Appearance: Normal and healthy appearance. Well-developed, overweight and not in distress.   Neck:      Vascular: No JVR. JVD normal.   Pulmonary:      Effort: Pulmonary effort is normal.      Breath sounds: Normal breath sounds. No wheezing. No rhonchi. No rales.   Chest:      Chest wall: Not tender to palpatation.   Cardiovascular:      PMI at left midclavicular line. Normal rate. Regular rhythm. Normal S1. Normal S2.       Murmurs: There is no murmur.      No gallop.  No click. No rub.   Pulses:     Intact distal pulses.   Edema:     Peripheral edema absent.   Abdominal:      Tenderness: There is no abdominal tenderness.   Musculoskeletal: Normal range of motion.         General: No tenderness. Skin:     General: Skin is warm and dry.   Neurological:      General: No focal deficit present.      Mental Status: Alert and oriented to person, place and time.           ASSESSMENT AND PLAN  Clinical impression    1.  Dizziness  2.  Palpitations  3.  No significant coronary disease per catheterization as described above.  Stress test in 2025 with left ventricular ejection fraction 45%  4.  No evidence of ischemia per stress test in 2025  5.  History of cocaine abuse  6.  Ventricular tachycardia, nonsustained by Zio patch in 2024    Plan recommendations    Will obtain an echocardiogram for left ventricular ejection fraction evaluation.    Get a " Holter monitor for 48 hours.  Patient states that he continues having episodes of dizziness even during this evaluation but he was in sinus rhythm.    Patient will be seen my office in the next week or 2 for reassessment of arrhythmias and symptoms.    Patient may benefit of a cardiac MRI.  Will decide this during the next office visit.    Continue with calcium channel blocker.    Continue beta-blocker therapy.    Risk factor modification and lifestyle modification discussed with patient. Diet , exercise and hydration discussed with patient.    I have personally review with patient during this office visit, laboratory data, echocardiogram results, stress test results, Holter-event monitor results prior and after the last electrophysiology visit. All questions has been answered.    Please excuse any errors in grammar or translation related to this dictation.  Voice recognition software was utilized to prepare this document.

## 2025-02-28 ENCOUNTER — ANCILLARY PROCEDURE (OUTPATIENT)
Dept: CARDIOLOGY | Facility: CLINIC | Age: 75
End: 2025-02-28
Payer: MEDICARE

## 2025-02-28 DIAGNOSIS — I47.10 PAROXYSMAL SUPRAVENTRICULAR TACHYCARDIA (CMS-HCC): ICD-10-CM

## 2025-02-28 DIAGNOSIS — R42 DIZZINESS: ICD-10-CM

## 2025-02-28 DIAGNOSIS — R55 NEAR SYNCOPE: ICD-10-CM

## 2025-03-07 PROCEDURE — 93227 XTRNL ECG REC<48 HR R&I: CPT | Performed by: INTERNAL MEDICINE

## 2025-03-10 ENCOUNTER — APPOINTMENT (OUTPATIENT)
Dept: CARDIOLOGY | Facility: CLINIC | Age: 75
End: 2025-03-10
Payer: MEDICARE

## 2025-03-10 VITALS
BODY MASS INDEX: 28.8 KG/M2 | DIASTOLIC BLOOD PRESSURE: 88 MMHG | WEIGHT: 201.2 LBS | HEART RATE: 65 BPM | SYSTOLIC BLOOD PRESSURE: 148 MMHG | HEIGHT: 70 IN

## 2025-03-10 DIAGNOSIS — R42 DIZZINESS: Primary | ICD-10-CM

## 2025-03-10 DIAGNOSIS — I47.10 PAROXYSMAL SUPRAVENTRICULAR TACHYCARDIA (CMS-HCC): ICD-10-CM

## 2025-03-10 DIAGNOSIS — I10 PRIMARY HYPERTENSION: ICD-10-CM

## 2025-03-10 DIAGNOSIS — I50.22 CHRONIC SYSTOLIC CONGESTIVE HEART FAILURE, NYHA CLASS 2: ICD-10-CM

## 2025-03-10 DIAGNOSIS — R55 NEAR SYNCOPE: ICD-10-CM

## 2025-03-10 DIAGNOSIS — I47.20 VT (VENTRICULAR TACHYCARDIA) (MULTI): ICD-10-CM

## 2025-03-10 DIAGNOSIS — Z87.891 FORMER SMOKER: ICD-10-CM

## 2025-03-10 PROCEDURE — 1159F MED LIST DOCD IN RCRD: CPT | Performed by: INTERNAL MEDICINE

## 2025-03-10 PROCEDURE — 1036F TOBACCO NON-USER: CPT | Performed by: INTERNAL MEDICINE

## 2025-03-10 PROCEDURE — 99214 OFFICE O/P EST MOD 30 MIN: CPT | Performed by: INTERNAL MEDICINE

## 2025-03-10 PROCEDURE — 3077F SYST BP >= 140 MM HG: CPT | Performed by: INTERNAL MEDICINE

## 2025-03-10 PROCEDURE — 3079F DIAST BP 80-89 MM HG: CPT | Performed by: INTERNAL MEDICINE

## 2025-03-10 RX ORDER — METOPROLOL SUCCINATE 50 MG/1
75 TABLET, EXTENDED RELEASE ORAL DAILY
Qty: 135 TABLET | Refills: 3 | Status: SHIPPED | OUTPATIENT
Start: 2025-03-10 | End: 2026-03-10

## 2025-03-10 NOTE — PATIENT INSTRUCTIONS
Increase Metoprolol 50mg take 1 and 1/2 tabs daily.    Please bring any lab results from other providers / physicians to your next appointment.     Please bring all medicines, vitamins, and herbal supplements with you when you come to the office.     Prescriptions will not be filled unless you are compliant with your follow up appointments or have a follow up appointment scheduled as per instruction of your physician. Refills should be requested at the time of your visit.      DID YOU KNOW:  We have a pharmacy here in the [facility].  They can fill all prescriptions, not just cardiac medications.  Prescriptions from other pharmacies can easily be transferred to the  pharmacy by the  pharmacist on site.   pharmacies offer FREE HOME DELIVERY on medications to anywhere in [place]. They can sync your medications. Typically prescriptions can be ready in 10 - 15 minutes. If pharmacy is unable to fill your  prescription or if cost is more than your paying now the Pharmacist can easily transfer back to your Pharmacy of choice. Pharmacy phone # [phone].     Scribe Attestation  By signing my name below, IRiley LPN , Alexis   attest that this documentation has been prepared under the direction and in the presence of Jose Nguyen MD.

## 2025-03-10 NOTE — PROGRESS NOTES
"CARDIOLOGY OFFICE VISIT      CHIEF COMPLAINT  Chief Complaint   Patient presents with    Follow-up     \"I think I am being seen for results today\"        HISTORY OF PRESENT ILLNESS  HPI  75-year-old male with a past medical history of polysubstance abuse including cocaine.  He apparently has been off cocaine use for the last 5 years.  Looking at his records because patient is very poor historian, patient has been complaining of palpitations on and off.  He had prior hospitalizations for SVT.  Apparently during Emergency Department evaluation due to palpitations, he had a Zio patch that was done in May 2024.  Results were not clear and apparently did not provide any follow-ups.  Zio patch show episodes of ventricular tachycardia nonsustained with the longest duration 11 seconds and the fastest 230 bpm.  He has a history of non-ST elevation microinfarction 2019 with negative FFR assessment above LAD and circumflex and coronary disease subsequently been 3 conservatively.  He had moderate severe left ventricular dysfunction in 2020 but a normalize later with a value of 60%. Subsequently 2022 ischemic assessment revealed no evidence for ischemia or infarction and ejection fraction 42% by stress imaging.     Based on results of the Holter monitor, and a stress test was performed.     Stress test 01/2025  IMPRESSION:  Abnormal Lexiscan Myoview cardiac perfusion stress test.  No  myocardial ischemia by perfusion imaging.  No  myocardial infarction by perfusion imaging.  Abnormal left ventricular systolic function.  Left ventricular ejection fraction 45 %. With mildly dilated left  ventricle No change when compared to previous study.      He states that he continues having episodes of dizziness.  Sometimes episodes of dizziness have been for 5 to 10 minutes of duration.  Dizziness appearing almost every day.     Holter monitor 02/2025  This is a Holter monitor for 48 hours.     The underlying rhythm was sinus rhythm with " occasional related PVCs at a rate of 74 bpm.  Sinus rhythm was remarkable during this study.     The minimum heart rate was 51 bpm, the maximal heart rate was 126 bpm average heart rate was 72 bpm.     There were frequent PVCs (different morphology) with the burden of PVCs 6.0% of the t total beats     There were also brief episode nonsustained ventricular tachycardia with the longest 7 beats and fastest 112 bpm.  Asymptomatic.     There were frequent premature atrial contractions in a pattern of isolated beats, atrial couplets, atrial triplets and also brief episodes of nonsustained supraventricular tachycardia with the longest 20 beats, fastest 152 bpm, asymptomatic.     No significant pauses or evidence of high-grade AV block were seen during this study.     Patient did not report symptoms during this study.     Conclusion     Underlying rhythm of sinus rhythm     Frequent PVCs and PACs seen during this study but no evidence of sustained atrial or ventricular arrhythmias.  Clinical correlation is to be made    Patient states that he is doing much better.  He has less amount of palpitations.  Echo personally reviewed results of the Holter monitor with patient during this office visit      Past Medical History  Past Medical History:   Diagnosis Date    Arrhythmia     Coronary artery disease     Diabetes mellitus (Multi)     Hypertension        Social History  Social History     Tobacco Use    Smoking status: Former     Current packs/day: 0.00     Average packs/day: 1 pack/day for 15.0 years (15.0 ttl pk-yrs)     Types: Cigarettes     Quit date:      Years since quittin.1    Smokeless tobacco: Never   Substance Use Topics    Alcohol use: Yes     Alcohol/week: 1.0 standard drink of alcohol     Types: 1 Shots of liquor per week     Comment: occasional    Drug use: Yes     Types: Marijuana     Comment: last used cocaine 2020, currently uses marijuana       Family History     Family History   Problem Relation  Name Age of Onset    Hypertension Mother      Heart attack Mother      Colon cancer Brother      Kidney cancer Brother      Diabetes Brother          Allergies:  Allergies   Allergen Reactions    Amoxicillin Itching    Ampicillin Itching    Milk Containing Products (Dairy) Itching     Itching, SOB    Penicillins Itching        Outpatient Medications:  Current Outpatient Medications   Medication Instructions    aspirin 81 mg EC tablet 1 tablet, Daily    atorvastatin (LIPITOR) 40 mg, Daily    dilTIAZem (CARDIZEM) 30 mg, oral, As needed, Take one tablet if PSVT unresolved can take another 1/2 hr later, if remains unresolved go to ER.    losartan (COZAAR) 25 mg, oral, Daily    magnesium oxide 400 mg, Daily    metFORMIN XR (GLUCOPHAGE-XR) 500 mg, 2 times daily (morning and late afternoon)    metoprolol succinate XL (TOPROL-XL) 50 mg, oral, Daily, Do not crush or chew.    omeprazole (PRILOSEC) 40 mg, Daily    OneTouch Ultra Test strip use 1 strip to check glucose once daily          REVIEW OF SYSTEMS  Review of Systems   All other systems reviewed and are negative.        VITALS  Vitals:    03/10/25 1044   BP: 148/88   Pulse: 65       PHYSICAL EXAM  Constitutional:       Appearance: Healthy appearance. Not in distress.   Neck:      Vascular: No JVR. JVD normal.   Pulmonary:      Effort: Pulmonary effort is normal.      Breath sounds: Normal breath sounds. No wheezing. No rhonchi. No rales.   Chest:      Chest wall: Not tender to palpatation.   Cardiovascular:      PMI at left midclavicular line. Normal rate. Regular rhythm. Normal S1. Normal S2.       Murmurs: There is no murmur.      No gallop.  No click. No rub.   Pulses:     Intact distal pulses.   Edema:     Peripheral edema absent.   Abdominal:      General: Bowel sounds are normal.      Palpations: Abdomen is soft.      Tenderness: There is no abdominal tenderness.   Musculoskeletal: Normal range of motion.         General: No tenderness. Skin:     General: Skin is  warm and dry.   Neurological:      General: No focal deficit present.      Mental Status: Alert and oriented to person, place and time.           ASSESSMENT AND PLAN  Clinical impression     1.  Dizziness  2.  Palpitations  3.  No significant coronary disease per catheterization as described above.  Stress test in 2025 with left ventricular ejection fraction 45%  4.  No evidence of ischemia per stress test in 2025  5.  History of cocaine abuse  6.  Ventricular tachycardia, nonsustained by Zio patch in 2024    Plan recommendations    Will obtain a cardiac MRI for evaluation for scar or infiltrative disorder.    Get echocardiogram for left ventricular ejection fraction evaluation.    Will increase the dose of metoprolol succinate to 75 mg 1 tablet daily.    Follow my office in next 6 to 8 weeks or sooner if needed    Risk factor modification and lifestyle modification discussed with patient. Diet , exercise and hydration discussed with patient.    I have personally review with patient during this office visit, laboratory data, echocardiogram results, stress test results, Holter-event monitor results prior and after the last electrophysiology visit. All questions has been answered.    Risk factor modification and lifestyle modification discussed with patient. Diet , exercise and hydration discussed with patient.    Please excuse any errors in grammar or translation related to this dictation.  Voice recognition software was utilized to prepare this document.

## 2025-04-17 ENCOUNTER — APPOINTMENT (OUTPATIENT)
Dept: CARDIOLOGY | Facility: CLINIC | Age: 75
End: 2025-04-17
Payer: MEDICARE

## 2025-04-24 ENCOUNTER — TELEPHONE (OUTPATIENT)
Dept: CARDIOLOGY | Facility: CLINIC | Age: 75
End: 2025-04-24
Payer: MEDICARE

## 2025-04-24 DIAGNOSIS — I47.20 VT (VENTRICULAR TACHYCARDIA) (MULTI): ICD-10-CM

## 2025-04-24 DIAGNOSIS — I10 PRIMARY HYPERTENSION: ICD-10-CM

## 2025-04-24 DIAGNOSIS — I50.22 CHRONIC SYSTOLIC CONGESTIVE HEART FAILURE, NYHA CLASS 2: ICD-10-CM

## 2025-04-24 DIAGNOSIS — I47.10 PAROXYSMAL SUPRAVENTRICULAR TACHYCARDIA (CMS-HCC): ICD-10-CM

## 2025-04-24 RX ORDER — METOPROLOL SUCCINATE 25 MG/1
25 TABLET, EXTENDED RELEASE ORAL DAILY
COMMUNITY

## 2025-04-24 NOTE — TELEPHONE ENCOUNTER
La Edilson-CNP a [facility] phoned with update from patient recent hospital stay at [facility] for exacerbation of a-fib. Records in care everywhere. Met. Succinate was decreased from 75mg daily to 25mg daily. Denies dizziness or chest pain. Recent vitals were 112/68 and 72.  POV 5/7/2025. Please advise for any changes.    To Dr. Erik Thomas, DO

## 2025-04-30 ENCOUNTER — TELEPHONE (OUTPATIENT)
Dept: CARDIOLOGY | Facility: CLINIC | Age: 75
End: 2025-04-30
Payer: MEDICARE

## 2025-04-30 NOTE — TELEPHONE ENCOUNTER
Patient phoned that his left ankle is swollen with large blister on heel. States PCP advise him to update his cardiologist. At [facility] now. FYI.    To Dr. Erik Thomas, DO

## 2025-05-01 ENCOUNTER — APPOINTMENT (OUTPATIENT)
Dept: CARDIOLOGY | Facility: CLINIC | Age: 75
End: 2025-05-01
Payer: MEDICARE

## 2025-05-06 ENCOUNTER — HOSPITAL ENCOUNTER (OUTPATIENT)
Dept: CARDIOLOGY | Facility: CLINIC | Age: 75
Discharge: HOME | End: 2025-05-06
Payer: MEDICARE

## 2025-05-06 VITALS
WEIGHT: 201 LBS | DIASTOLIC BLOOD PRESSURE: 84 MMHG | SYSTOLIC BLOOD PRESSURE: 148 MMHG | BODY MASS INDEX: 28.77 KG/M2 | HEIGHT: 70 IN

## 2025-05-06 DIAGNOSIS — R55 NEAR SYNCOPE: ICD-10-CM

## 2025-05-06 DIAGNOSIS — R42 DIZZINESS: ICD-10-CM

## 2025-05-06 DIAGNOSIS — I47.10 PAROXYSMAL SUPRAVENTRICULAR TACHYCARDIA (CMS-HCC): ICD-10-CM

## 2025-05-06 PROCEDURE — 93306 TTE W/DOPPLER COMPLETE: CPT | Performed by: INTERNAL MEDICINE

## 2025-05-06 PROCEDURE — 93306 TTE W/DOPPLER COMPLETE: CPT

## 2025-05-07 ENCOUNTER — APPOINTMENT (OUTPATIENT)
Dept: CARDIOLOGY | Facility: CLINIC | Age: 75
End: 2025-05-07
Payer: COMMERCIAL

## 2025-05-07 VITALS
BODY MASS INDEX: 28.14 KG/M2 | HEIGHT: 71 IN | SYSTOLIC BLOOD PRESSURE: 132 MMHG | DIASTOLIC BLOOD PRESSURE: 70 MMHG | WEIGHT: 201 LBS | HEART RATE: 61 BPM

## 2025-05-07 DIAGNOSIS — Z79.899 HIGH RISK MEDICATION USE: ICD-10-CM

## 2025-05-07 DIAGNOSIS — I48.0 PAROXYSMAL ATRIAL FIBRILLATION (MULTI): ICD-10-CM

## 2025-05-07 DIAGNOSIS — Z87.891 FORMER SMOKER: ICD-10-CM

## 2025-05-07 DIAGNOSIS — I47.10 PAROXYSMAL SUPRAVENTRICULAR TACHYCARDIA (CMS-HCC): ICD-10-CM

## 2025-05-07 DIAGNOSIS — E11.9 TYPE 2 DIABETES MELLITUS WITHOUT COMPLICATION, WITHOUT LONG-TERM CURRENT USE OF INSULIN: ICD-10-CM

## 2025-05-07 DIAGNOSIS — I25.119 CORONARY ARTERY DISEASE INVOLVING NATIVE CORONARY ARTERY OF NATIVE HEART WITH ANGINA PECTORIS: ICD-10-CM

## 2025-05-07 LAB
AORTIC VALVE MEAN GRADIENT: 8 MMHG
AORTIC VALVE PEAK VELOCITY: 1.93 M/S
AV PEAK GRADIENT: 15 MMHG
AVA (PEAK VEL): 2.13 CM2
AVA (VTI): 2.35 CM2
EJECTION FRACTION APICAL 4 CHAMBER: 52.8
EJECTION FRACTION: 53 %
LEFT ATRIUM VOLUME AREA LENGTH INDEX BSA: 36.3 ML/M2
LEFT VENTRICLE INTERNAL DIMENSION DIASTOLE: 5.48 CM (ref 3.5–6)
LEFT VENTRICULAR OUTFLOW TRACT DIAMETER: 2.51 CM
LV EJECTION FRACTION BIPLANE: 52 %
MITRAL VALVE E/A RATIO: 0.69
MITRAL VALVE E/E' RATIO: 9.38
RIGHT VENTRICLE FREE WALL PEAK S': 14.51 CM/S
RIGHT VENTRICLE PEAK SYSTOLIC PRESSURE: 13.5 MMHG

## 2025-05-07 PROCEDURE — 99214 OFFICE O/P EST MOD 30 MIN: CPT | Performed by: INTERNAL MEDICINE

## 2025-05-07 PROCEDURE — 1036F TOBACCO NON-USER: CPT | Performed by: INTERNAL MEDICINE

## 2025-05-07 PROCEDURE — 93000 ELECTROCARDIOGRAM COMPLETE: CPT | Performed by: INTERNAL MEDICINE

## 2025-05-07 PROCEDURE — 1159F MED LIST DOCD IN RCRD: CPT | Performed by: INTERNAL MEDICINE

## 2025-05-07 PROCEDURE — 3078F DIAST BP <80 MM HG: CPT | Performed by: INTERNAL MEDICINE

## 2025-05-07 PROCEDURE — 3075F SYST BP GE 130 - 139MM HG: CPT | Performed by: INTERNAL MEDICINE

## 2025-05-07 PROCEDURE — 1160F RVW MEDS BY RX/DR IN RCRD: CPT | Performed by: INTERNAL MEDICINE

## 2025-05-07 RX ORDER — AMIODARONE HYDROCHLORIDE 200 MG/1
2 TABLET ORAL DAILY
COMMUNITY
Start: 2025-04-21

## 2025-05-07 RX ORDER — DILTIAZEM HYDROCHLORIDE 180 MG/1
1 CAPSULE, COATED, EXTENDED RELEASE ORAL
COMMUNITY
Start: 2025-04-11

## 2025-05-07 RX ORDER — CALCIUM CARBONATE/VITAMIN D3 500-10/5ML
400 LIQUID (ML) ORAL
COMMUNITY
Start: 2025-04-21

## 2025-05-07 ASSESSMENT — ENCOUNTER SYMPTOMS: DIZZINESS: 1

## 2025-05-07 NOTE — PROGRESS NOTES
"Chief Complaint   Patient presents with    Follow-up     4 months for Coronary artery disease involving native coronary artery of native heart with angina pectoris        Subjective   Alfie Slaughter is a 75 y.o. male     75-year-old gentleman returns for follow-up following recent hospitalizations x 2 for PSVT and atrial fibrillation, finally controlled and treated with amiodarone, metoprolol succinate and institution of diltiazem; now in sinus rhythm with first-degree AV block at a rate of 61.  Left ventricular function is improved by recent echo measurement, with ejection fraction of 50-55%.    He was to have follow-up office visit with Dr. Nguyen mid April just after his second discharge, he showed up but there was weight and therefore he signed out and rescheduled his appointment for July.    He still asking whether he needs to seek out electrophysiology opinion and I described that it is important that he follow-up with electrophysiology for further consideration of ablation.    Currently there is no evidence of heart failure or  acute coronary syndrome, or recurrent arrhythmia    Recommendations, follow-up with nurse practitioner in 6 to 8 months, follow-up with Dr. Nguyen as scheduled in July, concurrent continue current therapies         Review of Systems   Neurological:  Positive for dizziness.   All other systems reviewed and are negative.           Vitals:    05/07/25 1156   BP: 132/70   BP Location: Left arm   Patient Position: Sitting   Pulse: 61   Weight: 91.2 kg (201 lb)   Height: 1.803 m (5' 11\")        Objective   Physical Exam    Allergies  Amoxicillin, Ampicillin, Milk containing products (dairy), and Penicillins     Current Medications  Current Outpatient Medications   Medication Instructions    amiodarone (Pacerone) 200 mg tablet 2 tablets, Daily    apixaban (ELIQUIS) 5 mg, 2 times daily    aspirin 81 mg EC tablet 1 tablet, Daily    atorvastatin (LIPITOR) 40 mg, Daily    dilTIAZem CD (Cardizem " CD) 180 mg 24 hr capsule 1 capsule, Daily (0630)    losartan (COZAAR) 25 mg, oral, Daily    magnesium oxide 400 mg    metFORMIN XR (GLUCOPHAGE-XR) 500 mg, 2 times daily (morning and late afternoon)    metoprolol succinate XL (TOPROL-XL) 25 mg, Daily    omeprazole (PRILOSEC) 40 mg, Daily    OneTouch Ultra Test strip use 1 strip to check glucose once daily                        Assessment/Plan   1. Coronary artery disease involving native coronary artery of native heart with angina pectoris  Follow Up In Cardiology      2. Paroxysmal atrial fibrillation (Multi)        3. Paroxysmal supraventricular tachycardia (CMS-HCC)        4. Type 2 diabetes mellitus without complication, without long-term current use of insulin        5. High risk medication use        6. Former smoker        7. BMI 28.0-28.9,adult                 Scribe Attestation  By signing my name below, Lenora MERAZ LPN, Scribe   attest that this documentation has been prepared under the direction and in the presence of Erik Thomas DO.     Provider Attestation - Scribe documentation    All medical record entries made by the Scribe were at my direction and personally dictated by me. I have reviewed the chart and agree that the record accurately reflects my personal performance of the history, physical exam, discussion and plan.

## 2025-05-07 NOTE — LETTER
May 7, 2025     RHODA Orellana-CNP  [address] [facility] [id number]    Patient: Alfie Slaughter   YOB: 1950   Date of Visit: 5/7/2025       Dear Dr. La Waggoner, APRN-CNP:    Thank you for referring Alfie Slaughter to me for evaluation. Below are my notes for this consultation.  If you have questions, please do not hesitate to call me. I look forward to following your patient along with you.       Sincerely,     Erik Thomas, DO      CC: No Recipients  ______________________________________________________________________________________    Chief Complaint   Patient presents with   • Follow-up     4 months for Coronary artery disease involving native coronary artery of native heart with angina pectoris        Subjective   Alfie Slaughter is a 75 y.o. male     75-year-old gentleman returns for follow-up following recent hospitalizations x 2 for PSVT and atrial fibrillation, finally controlled and treated with amiodarone, metoprolol succinate and institution of diltiazem; now in sinus rhythm with first-degree AV block at a rate of 61.  Left ventricular function is improved by recent echo measurement, with ejection fraction of 50-55%.    He was to have follow-up office visit with Dr. Nguyen mid April just after his second discharge, he showed up but there was weight and therefore he signed out and rescheduled his appointment for July.    He still asking whether he needs to seek out electrophysiology opinion and I described that it is important that he follow-up with electrophysiology for further consideration of ablation.    Currently there is no evidence of heart failure or  acute coronary syndrome, or recurrent arrhythmia    Recommendations, follow-up with nurse practitioner in 6 to 8 months, follow-up with Dr. Nguyen as scheduled in July, concurrent continue current therapies         Review of Systems   Neurological:  Positive for dizziness.   All other systems reviewed and are negative.    "        Vitals:    05/07/25 1156   BP: 132/70   BP Location: Left arm   Patient Position: Sitting   Pulse: 61   Weight: 91.2 kg (201 lb)   Height: 1.803 m (5' 11\")        Objective   Physical Exam    Allergies  Amoxicillin, Ampicillin, Milk containing products (dairy), and Penicillins     Current Medications  Current Outpatient Medications   Medication Instructions   • amiodarone (Pacerone) 200 mg tablet 2 tablets, Daily   • apixaban (ELIQUIS) 5 mg, 2 times daily   • aspirin 81 mg EC tablet 1 tablet, Daily   • atorvastatin (LIPITOR) 40 mg, Daily   • dilTIAZem CD (Cardizem CD) 180 mg 24 hr capsule 1 capsule, Daily (0630)   • losartan (COZAAR) 25 mg, oral, Daily   • magnesium oxide 400 mg   • metFORMIN XR (GLUCOPHAGE-XR) 500 mg, 2 times daily (morning and late afternoon)   • metoprolol succinate XL (TOPROL-XL) 25 mg, Daily   • omeprazole (PRILOSEC) 40 mg, Daily   • OneTouch Ultra Test strip use 1 strip to check glucose once daily                        Assessment/Plan   1. Coronary artery disease involving native coronary artery of native heart with angina pectoris  Follow Up In Cardiology      2. Paroxysmal atrial fibrillation (Multi)        3. Paroxysmal supraventricular tachycardia (CMS-HCC)        4. Type 2 diabetes mellitus without complication, without long-term current use of insulin        5. High risk medication use        6. Former smoker        7. BMI 28.0-28.9,adult                 Scribe Attestation  By signing my name below, Lenora MERAZ LPN Scribe   attest that this documentation has been prepared under the direction and in the presence of Erik Thomas DO.     Provider Attestation - Scribe documentation    All medical record entries made by the Scribe were at my direction and personally dictated by me. I have reviewed the chart and agree that the record accurately reflects my personal performance of the history, physical exam, discussion and plan.   "

## 2025-05-07 NOTE — PATIENT INSTRUCTIONS
Please bring all medicines, vitamins, and herbal supplements with you when you come to the office.    Prescriptions will not be filled unless you are compliant with your follow up appointments or have a follow up appointment scheduled as per instruction of your physician. Refills should be requested at the time of your visit.     BMI was above normal measurement. Current weight: 91.2 kg (201 lb)  Weight change since last visit (-) denotes wt loss 0 lbs   Weight loss needed to achieve BMI 25: 22.1 Lbs  Weight loss needed to achieve BMI 30: -13.6 Lbs  Provided instructions on dietary changes  Provided instructions on exercise.

## 2025-06-16 ENCOUNTER — APPOINTMENT (OUTPATIENT)
Dept: CARDIOLOGY | Facility: CLINIC | Age: 75
End: 2025-06-16
Payer: MEDICARE

## 2025-06-16 VITALS
HEIGHT: 70 IN | BODY MASS INDEX: 29.18 KG/M2 | SYSTOLIC BLOOD PRESSURE: 118 MMHG | HEART RATE: 69 BPM | WEIGHT: 203.8 LBS | DIASTOLIC BLOOD PRESSURE: 78 MMHG

## 2025-06-16 DIAGNOSIS — R42 DIZZINESS: ICD-10-CM

## 2025-06-16 DIAGNOSIS — E11.9 TYPE 2 DIABETES MELLITUS WITHOUT COMPLICATION, WITHOUT LONG-TERM CURRENT USE OF INSULIN: ICD-10-CM

## 2025-06-16 DIAGNOSIS — I25.119 CORONARY ARTERY DISEASE INVOLVING NATIVE CORONARY ARTERY OF NATIVE HEART WITH ANGINA PECTORIS: Primary | ICD-10-CM

## 2025-06-16 DIAGNOSIS — Z87.891 FORMER SMOKER: ICD-10-CM

## 2025-06-16 DIAGNOSIS — Z79.899 ON AMIODARONE THERAPY: ICD-10-CM

## 2025-06-16 DIAGNOSIS — I48.0 PAROXYSMAL ATRIAL FIBRILLATION (MULTI): ICD-10-CM

## 2025-06-16 DIAGNOSIS — Z79.899 HIGH RISK MEDICATION USE: ICD-10-CM

## 2025-06-16 PROCEDURE — 1036F TOBACCO NON-USER: CPT | Performed by: INTERNAL MEDICINE

## 2025-06-16 PROCEDURE — 99215 OFFICE O/P EST HI 40 MIN: CPT | Performed by: INTERNAL MEDICINE

## 2025-06-16 PROCEDURE — 3078F DIAST BP <80 MM HG: CPT | Performed by: INTERNAL MEDICINE

## 2025-06-16 PROCEDURE — 1159F MED LIST DOCD IN RCRD: CPT | Performed by: INTERNAL MEDICINE

## 2025-06-16 PROCEDURE — 3074F SYST BP LT 130 MM HG: CPT | Performed by: INTERNAL MEDICINE

## 2025-06-16 RX ORDER — ALBUTEROL SULFATE 90 UG/1
1 INHALANT RESPIRATORY (INHALATION) ONCE
OUTPATIENT
Start: 2025-06-16

## 2025-06-16 RX ORDER — DILTIAZEM HYDROCHLORIDE 180 MG/1
180 CAPSULE, COATED, EXTENDED RELEASE ORAL
Qty: 90 CAPSULE | Refills: 1 | Status: SHIPPED | OUTPATIENT
Start: 2025-06-16

## 2025-06-16 RX ORDER — METOPROLOL SUCCINATE 25 MG/1
25 TABLET, EXTENDED RELEASE ORAL DAILY
Qty: 90 TABLET | Refills: 1 | Status: SHIPPED | OUTPATIENT
Start: 2025-06-16

## 2025-06-16 RX ORDER — AMIODARONE HYDROCHLORIDE 200 MG/1
200 TABLET ORAL DAILY
Qty: 90 TABLET | Refills: 1 | Status: SHIPPED | OUTPATIENT
Start: 2025-06-16

## 2025-06-16 RX ORDER — ALBUTEROL SULFATE 0.83 MG/ML
3 SOLUTION RESPIRATORY (INHALATION) ONCE
OUTPATIENT
Start: 2025-06-16 | End: 2025-06-16

## 2025-06-16 NOTE — PROGRESS NOTES
CARDIOLOGY OFFICE VISIT      CHIEF COMPLAINT  Chief Complaint   Patient presents with    Follow-up     Presents to the office today for follow up on afib, VT, CAD.        HISTORY OF PRESENT ILLNESS  HPI  75-year-old male with a past medical history of polysubstance abuse including cocaine.  He apparently has been off cocaine use for the last 5 years.  Looking at his records because patient is very poor historian, patient has been complaining of palpitations on and off.  He had prior hospitalizations for SVT.  Apparently during Emergency Department evaluation due to palpitations, he had a Zio patch that was done in May 2024.  Results were not clear and apparently did not provide any follow-ups.  Zio patch show episodes of ventricular tachycardia nonsustained with the longest duration 11 seconds and the fastest 230 bpm.  He has a history of non-ST elevation microinfarction 2019 with negative FFR assessment above LAD and circumflex and coronary disease subsequently been 3 conservatively.  He had moderate severe left ventricular dysfunction in 2020 but a normalize later with a value of 60%. Subsequently 2022 ischemic assessment revealed no evidence for ischemia or infarction and ejection fraction 42% by stress imaging.     Based on results of the Holter monitor, and a stress test was performed.     Stress test 01/2025  IMPRESSION:  Abnormal Lexiscan Myoview cardiac perfusion stress test.  No  myocardial ischemia by perfusion imaging.  No  myocardial infarction by perfusion imaging.  Abnormal left ventricular systolic function.  Left ventricular ejection fraction 45 %. With mildly dilated left  ventricle No change when compared to previous study.      He states that he continues having episodes of dizziness.  Sometimes episodes of dizziness have been for 5 to 10 minutes of duration.  Dizziness appearing almost every day.     Holter monitor 02/2025  This is a Holter monitor for 48 hours.     The underlying rhythm was  sinus rhythm with occasional related PVCs at a rate of 74 bpm.  Sinus rhythm was remarkable during this study.     The minimum heart rate was 51 bpm, the maximal heart rate was 126 bpm average heart rate was 72 bpm.     There were frequent PVCs (different morphology) with the burden of PVCs 6.0% of the t total beats     There were also brief episode nonsustained ventricular tachycardia with the longest 7 beats and fastest 112 bpm.  Asymptomatic.     There were frequent premature atrial contractions in a pattern of isolated beats, atrial couplets, atrial triplets and also brief episodes of nonsustained supraventricular tachycardia with the longest 20 beats, fastest 152 bpm, asymptomatic.     No significant pauses or evidence of high-grade AV block were seen during this study.     Patient did not report symptoms during this study.     Conclusion     Underlying rhythm of sinus rhythm     Frequent PVCs and PACs seen during this study but no evidence of sustained atrial or ventricular arrhythmias.  Clinical correlation is to be made    Patient states that he was doing well until April 2025.  He had an episode of palpitations.  He ended in outside hospital.  He was diagnosed of atrial fibrillation.  He was placed on amiodarone.  He was supposed to be transferred to [facility] but patient refused.    Subsequent EKG during office visit in May 7, 2025 shows sinus rhythm.    Patient states that so far he has been doing well but he continues having occasional palpitations and shortness of breath.    Patient is still using amiodarone 200 mg 2 tablets daily.        Echocardiogram May 2025    CONCLUSIONS:   1. Left ventricular ejection fraction is low normal, by visual estimate at 50-55%.   2. Spectral Doppler shows a Grade I (impaired relaxation pattern) of left ventricular diastolic filling with normal left atrial filling pressure.   3. Mild_moderate inferior wall hypokinesis, mild concentric left ventricular  hypertrophy.   4. There is normal right ventricular global systolic function.  Past Medical History  Medical History[1]    Social History  Social History[2]    Family History   Family History[3]     Allergies:  RX Allergies[4]     Outpatient Medications:  Current Outpatient Medications   Medication Instructions    amiodarone (Pacerone) 200 mg tablet 2 tablets, Daily    apixaban (ELIQUIS) 5 mg, 2 times daily    aspirin 81 mg EC tablet 1 tablet, Daily    atorvastatin (LIPITOR) 40 mg, Daily    dilTIAZem CD (Cardizem CD) 180 mg 24 hr capsule 1 capsule, Daily (0630)    losartan (COZAAR) 25 mg, oral, Daily    magnesium oxide 400 mg, Daily    metFORMIN XR (GLUCOPHAGE-XR) 500 mg, 2 times daily (morning and late afternoon)    metoprolol succinate XL (TOPROL-XL) 25 mg, Daily    omeprazole (PRILOSEC) 40 mg, Daily    OneTouch Ultra Test strip use 1 strip to check glucose once daily          REVIEW OF SYSTEMS  Review of Systems   All other systems reviewed and are negative.        VITALS  Vitals:    06/16/25 0950   BP: 118/78   Pulse: 69       PHYSICAL EXAM  Constitutional:       Appearance: Healthy appearance. Not in distress.   Neck:      Vascular: No JVR. JVD normal.   Pulmonary:      Effort: Pulmonary effort is normal.      Breath sounds: Normal breath sounds. No wheezing. No rhonchi. No rales.   Chest:      Chest wall: Not tender to palpatation.   Cardiovascular:      PMI at left midclavicular line. Normal rate. Regular rhythm. Normal S1. Normal S2.       Murmurs: There is no murmur.      No gallop.  No click. No rub.   Pulses:     Intact distal pulses.   Edema:     Peripheral edema absent.   Abdominal:      General: Bowel sounds are normal.      Palpations: Abdomen is soft.      Tenderness: There is no abdominal tenderness.   Musculoskeletal: Normal range of motion.         General: No tenderness. Skin:     General: Skin is warm and dry.   Neurological:      General: No focal deficit present.      Mental Status: Alert and  oriented to person, place and time.           ASSESSMENT AND PLAN    Clinical impression     1.  Dizziness  2.  Palpitations  3.  No significant coronary disease per catheterization as described above.  Stress test in 2025 with left ventricular ejection fraction 45%  4.  No evidence of ischemia per stress test in 2025  5.  History of cocaine abuse  6.  Ventricular tachycardia, nonsustained by Zio patch in 2024  7.  Evidence of atrial fibrillation during admission in April 2025  8.  High risk medication (amiodarone)  9.  Long-term anticoagulant therapy with Eliquis    Plan recommendations    I had a discussion with patient regarding management for atrial fibrillation.  Patient should continue amiodarone therapy for now.    We will cut the dose of amiodarone to 200 mg daily.    Also discussed the option of an ablation therapy.  Patient states that he would like to think about this.    Follow my office in 3 months or sooner if needed    Will obtain a Holter monitor for 48 hours prior to next office visit.    Gets CMP and TSH every 6 months for high risk medication.    Complete PFTs annually for high risk medication.    Risk factor modification and lifestyle modification discussed with patient. Diet , exercise and hydration discussed with patient.    I have personally review with patient during this office visit, laboratory data, echocardiogram results, stress test results, Holter-event monitor results prior and after the last electrophysiology visit. All questions has been answered.    Please excuse any errors in grammar or translation related to this dictation.  Voice recognition software was utilized to prepare this document.        Scribe Attestation  By signing my name below, ICate LPN , Scribe   attest that this documentation has been prepared under the direction and in the presence of Jose Nguyen MD      I, Dr. Nguyen, personally performed the services described in the documentation as scribed by the  nurse in my presence, and confirm it is both accurate and complete.            [1]   Past Medical History:  Diagnosis Date    Arrhythmia     Coronary artery disease     Diabetes mellitus (Multi)     Hypertension    [2]   Social History  Tobacco Use    Smoking status: Former     Current packs/day: 0.00     Average packs/day: 1 pack/day for 15.0 years (15.0 ttl pk-yrs)     Types: Cigarettes     Quit date:      Years since quittin.4    Smokeless tobacco: Never   Substance Use Topics    Alcohol use: Yes     Alcohol/week: 1.0 standard drink of alcohol     Types: 1 Shots of liquor per week     Comment: occasional    Drug use: Yes     Types: Marijuana     Comment: last used cocaine 2020, currently uses marijuana   [3]   Family History  Problem Relation Name Age of Onset    Hypertension Mother      Heart attack Mother      Colon cancer Brother      Kidney cancer Brother      Diabetes Brother     [4]   Allergies  Allergen Reactions    Amoxicillin Itching    Ampicillin Itching    Milk Containing Products (Dairy) Itching     Itching, SOB    Penicillins Itching

## 2025-06-16 NOTE — PATIENT INSTRUCTIONS
Follow up 3 months  Fasting labs to be done approximately 1 week prior to next appointment. We are a paperless office.  The order is in the computer and you can go to any  lab to have done. You can always ask for order to be printed if you'd like to go to outside lab.      48 hour holter to be applied prior to next appointment  You will be scheduled for a heart monitor to be applied.  On the day you come in to have the monitor applied, please shower prior to coming in and do not apply any creams, lotions or powders to your chest prior to coming in on the day your monitor is scheduled to be applied.     PFT (pulmonary function test) to be done prior to next appointment  Breathing test for lungs      Decrease Amiodarone 200 mg- take 1 tablet  daily          DID YOU KNOW  We have a pharmacy here in the [facility].  They can fill all prescriptions, not just cardiac medications.  Prescriptions from other pharmacies can easily be transferred to the  pharmacy by the  pharmacist on site.   pharmacies offer FREE HOME DELIVERY on medications to anywhere in [place]. They can sync your medications. Typically prescriptions can be ready in 10 - 15 minutes. If pharmacy is unable to fill your  prescription or if cost is more than your paying now the Pharmacist can easily transfer back to your Pharmacy of choice. Pharmacy phone # [phone].     Please bring all medicines, vitamins, and herbal supplements with you in original bottles to every appointment!!!!    Prescriptions will not be filled unless you are compliant with your follow up appointments or have a follow up appointment scheduled as per instruction of your physician. Refills should be requested at the time of your visit.

## 2025-06-17 ENCOUNTER — TELEPHONE (OUTPATIENT)
Dept: CARDIOLOGY | Facility: CLINIC | Age: 75
End: 2025-06-17
Payer: MEDICARE

## 2025-06-17 NOTE — TELEPHONE ENCOUNTER
[place] [facility] [place] at 11am with an arrival time of 10:45am. I spoke to the patient and provided him with the appointment details, prep and the phone number to [facility] scheduling should he need to change his appointment. Spoke to Taylor at [facility] scheduling.

## 2025-07-05 DIAGNOSIS — I10 PRIMARY HYPERTENSION: ICD-10-CM

## 2025-07-08 RX ORDER — LOSARTAN POTASSIUM 25 MG/1
25 TABLET ORAL DAILY
Qty: 90 TABLET | Refills: 3 | Status: SHIPPED | OUTPATIENT
Start: 2025-07-08 | End: 2026-07-08

## 2025-08-07 ENCOUNTER — TELEPHONE (OUTPATIENT)
Dept: CARDIOLOGY | Facility: CLINIC | Age: 75
End: 2025-08-07
Payer: MEDICARE

## 2025-08-07 NOTE — TELEPHONE ENCOUNTER
Pharmacy phoned when patient picked up refills today states he did not want Amiodarone, will not take it anymore. Due to side effects. Attempted to phone patient to see what side effects he is having, detailed message left. TO [facility] for follow up.

## 2025-08-08 NOTE — TELEPHONE ENCOUNTER
Phoned patient states the amio made him dizzy, fatigued and effected his eye site to the point he was afraid to drive. Has been off amio therapy x2 months. HR on home device ranging 60-70. Has had cardiac complaints since discontinuing the medication. Does not want to be on alternative at this point in time either.  Medication list updated.      To Dr. Erik Thomas DO for review.

## 2025-08-14 ENCOUNTER — TELEPHONE (OUTPATIENT)
Dept: CARDIOLOGY | Facility: CLINIC | Age: 75
End: 2025-08-14
Payer: MEDICARE

## 2025-09-02 ENCOUNTER — APPOINTMENT (OUTPATIENT)
Dept: CARDIOLOGY | Facility: CLINIC | Age: 75
End: 2025-09-02
Payer: MEDICARE

## 2025-09-29 ENCOUNTER — APPOINTMENT (OUTPATIENT)
Dept: CARDIOLOGY | Facility: CLINIC | Age: 75
End: 2025-09-29
Payer: MEDICARE

## 2025-12-08 ENCOUNTER — APPOINTMENT (OUTPATIENT)
Dept: CARDIOLOGY | Facility: CLINIC | Age: 75
End: 2025-12-08
Payer: MEDICARE